# Patient Record
Sex: MALE | Race: WHITE | Employment: OTHER | URBAN - METROPOLITAN AREA
[De-identification: names, ages, dates, MRNs, and addresses within clinical notes are randomized per-mention and may not be internally consistent; named-entity substitution may affect disease eponyms.]

---

## 2017-04-13 ENCOUNTER — HOSPITAL ENCOUNTER (OUTPATIENT)
Dept: OTHER | Age: 72
Discharge: HOME | End: 2017-04-13

## 2017-04-13 DIAGNOSIS — Z00.6 CLINICAL TRIAL EXAM: ICD-10-CM

## 2018-03-27 ENCOUNTER — HOSPITAL ENCOUNTER (OUTPATIENT)
Dept: OTHER | Age: 73
Discharge: HOME | End: 2018-03-27

## 2018-03-27 DIAGNOSIS — Z00.6 CLINICAL TRIAL EXAM: ICD-10-CM

## 2021-07-15 NOTE — PROGRESS NOTES
[address] [facility] time___7/21/21 1000_________        Surgery time____1100________    Take the following medications with a sip of water:    Do not eat or drink anything after 12:00 midnight prior to your surgery. This includes water chewing gum, mints and ice chips. You may brush your teeth and gargle the morning of your surgery, but do not swallow the water     Please see your family doctor/pediatrician for a history and physical and/or concerning medications. Bring any test results/reports from your physicians office. If you are under the care of a heart doctor or specialist doctor, please be aware that you may be asked to them for clearance    You may be asked to stop blood thinners such as Coumadin, Plavix, Fragmin, Lovenox, etc., or any anti-inflammatories such as:  Aspirin, Ibuprofen, Advil, Naproxen prior to your surgery. We also ask that you stop any OTC medications such as fish oil, vitamin E, glucosamine, garlic, Multivitamins, COQ 10, etc.    We ask that you do not smoke 24 hours prior to surgery  We ask that you do not  drink any alcoholic beverages 24 hours prior to surgery     You must make arrangements for a responsible adult to take you home after your surgery. For your safety you will not be allowed to leave alone or drive yourself home. Your surgery will be cancelled if you do not have a ride home. Also for your safety, it is strongly suggested that someone stay with you the first 24 hours after your surgery. A parent or legal guardian must accompany a child scheduled for surgery and plan to stay at the hospital until the child is discharged. Please do not bring other children with you. For your comfort, please wear simple loose fitting clothing to the hospital.  Please do not bring valuables.     Do not wear any make-up or nail polish on your fingers or toes      For your safety, please do not wear any jewelry or body piercing's on the day of surgery. All jewelry must be removed. If you have dentures, they will be removed before going to operating room. For your convenience, we will provide you with a container. If you wear contact lenses or glasses, they will be removed, please bring a case for them. If you have a living will and a durable power of  for healthcare, please bring in a copy. As part of our patient safety program to minimize surgical site infections, we ask you to do the following:    · Please notify your surgeon if you develop any illness between         now and the  day of your surgery. · This includes a cough, cold, fever, sore throat, nausea,         or vomiting, and diarrhea, etc.  ·  Please notify your surgeon if you experience dizziness, shortness         of breath or blurred vision between now and the time of your surgery. Do not shave your operative site 96 hours prior to surgery. For face and neck surgery, men may use an electric razor 48 hours   prior to surgery. You may shower the night before surgery or the morning of   your surgery with an antibacterial soap. You will need to bring a photo ID and insurance card    [facility] has an onsite pharmacy, would you like to utilize our pharmacy     If you will be staying overnight and use a C-pap machine, please bring   your C-pap to hospital     Our goal is to provide you with excellent care, therefore, visitors will be limited to two(2) in the room at a time so that we may focus on providing this care for you. Please contact pre-admission testing if you have any further questions. [facility] phone number:  [phone]  Please note these are generalized instructions for all surgical cases, you may be provided with more specific instructions according to your surgery.

## 2021-07-20 ENCOUNTER — ANESTHESIA EVENT (OUTPATIENT)
Dept: ENDOSCOPY | Age: 76
End: 2021-07-20
Payer: MEDICARE

## 2021-07-21 ENCOUNTER — HOSPITAL ENCOUNTER (OUTPATIENT)
Age: 76
Setting detail: OUTPATIENT SURGERY
Discharge: HOME | End: 2021-07-21
Attending: INTERNAL MEDICINE | Admitting: INTERNAL MEDICINE
Payer: MEDICARE

## 2021-07-21 ENCOUNTER — ANESTHESIA (OUTPATIENT)
Dept: ENDOSCOPY | Age: 76
End: 2021-07-21
Payer: MEDICARE

## 2021-07-21 VITALS
TEMPERATURE: 96.7 F | OXYGEN SATURATION: 98 % | HEART RATE: 87 BPM | BODY MASS INDEX: 31.39 KG/M2 | WEIGHT: 257.8 LBS | HEIGHT: 76 IN | DIASTOLIC BLOOD PRESSURE: 68 MMHG | RESPIRATION RATE: 16 BRPM | SYSTOLIC BLOOD PRESSURE: 120 MMHG

## 2021-07-21 VITALS — OXYGEN SATURATION: 96 % | SYSTOLIC BLOOD PRESSURE: 112 MMHG | DIASTOLIC BLOOD PRESSURE: 67 MMHG

## 2021-07-21 DIAGNOSIS — Z86.010 HISTORY OF COLON POLYPS: ICD-10-CM

## 2021-07-21 LAB
GLUCOSE BLD-MCNC: 208 MG/DL (ref 70–99)
PERFORMED ON: ABNORMAL

## 2021-07-21 PROCEDURE — 7100000011 HC PHASE II RECOVERY - ADDTL 15 MIN: Performed by: INTERNAL MEDICINE

## 2021-07-21 PROCEDURE — 3700000001 HC ADD 15 MINUTES (ANESTHESIA): Performed by: INTERNAL MEDICINE

## 2021-07-21 PROCEDURE — 7100000010 HC PHASE II RECOVERY - FIRST 15 MIN: Performed by: INTERNAL MEDICINE

## 2021-07-21 PROCEDURE — 3700000000 HC ANESTHESIA ATTENDED CARE: Performed by: INTERNAL MEDICINE

## 2021-07-21 PROCEDURE — 6360000002 HC RX W HCPCS

## 2021-07-21 PROCEDURE — 2709999900 HC NON-CHARGEABLE SUPPLY: Performed by: INTERNAL MEDICINE

## 2021-07-21 PROCEDURE — 2580000003 HC RX 258: Performed by: ANESTHESIOLOGY

## 2021-07-21 PROCEDURE — 88305 TISSUE EXAM BY PATHOLOGIST: CPT

## 2021-07-21 PROCEDURE — 3609010600 HC COLONOSCOPY POLYPECTOMY SNARE/COLD BIOPSY: Performed by: INTERNAL MEDICINE

## 2021-07-21 RX ORDER — SODIUM CHLORIDE 0.9 % (FLUSH) 0.9 %
10 SYRINGE (ML) INJECTION EVERY 12 HOURS SCHEDULED
Status: DISCONTINUED | OUTPATIENT
Start: 2021-07-21 | End: 2021-07-21 | Stop reason: HOSPADM

## 2021-07-21 RX ORDER — PROPOFOL 10 MG/ML
INJECTION, EMULSION INTRAVENOUS PRN
Status: DISCONTINUED | OUTPATIENT
Start: 2021-07-21 | End: 2021-07-21 | Stop reason: SDUPTHER

## 2021-07-21 RX ORDER — SODIUM CHLORIDE 9 MG/ML
25 INJECTION, SOLUTION INTRAVENOUS PRN
Status: DISCONTINUED | OUTPATIENT
Start: 2021-07-21 | End: 2021-07-21 | Stop reason: HOSPADM

## 2021-07-21 RX ORDER — SODIUM CHLORIDE 0.9 % (FLUSH) 0.9 %
10 SYRINGE (ML) INJECTION PRN
Status: DISCONTINUED | OUTPATIENT
Start: 2021-07-21 | End: 2021-07-21 | Stop reason: HOSPADM

## 2021-07-21 RX ORDER — SODIUM CHLORIDE, SODIUM LACTATE, POTASSIUM CHLORIDE, CALCIUM CHLORIDE 600; 310; 30; 20 MG/100ML; MG/100ML; MG/100ML; MG/100ML
INJECTION, SOLUTION INTRAVENOUS CONTINUOUS
Status: DISCONTINUED | OUTPATIENT
Start: 2021-07-21 | End: 2021-07-21 | Stop reason: HOSPADM

## 2021-07-21 RX ADMIN — PROPOFOL 20 MG: 10 INJECTION, EMULSION INTRAVENOUS at 11:51

## 2021-07-21 RX ADMIN — PROPOFOL 20 MG: 10 INJECTION, EMULSION INTRAVENOUS at 11:53

## 2021-07-21 RX ADMIN — PROPOFOL 120 MG: 10 INJECTION, EMULSION INTRAVENOUS at 11:45

## 2021-07-21 RX ADMIN — PROPOFOL 20 MG: 10 INJECTION, EMULSION INTRAVENOUS at 11:49

## 2021-07-21 RX ADMIN — PROPOFOL 20 MG: 10 INJECTION, EMULSION INTRAVENOUS at 11:55

## 2021-07-21 RX ADMIN — SODIUM CHLORIDE 25 ML: 9 INJECTION, SOLUTION INTRAVENOUS at 11:05

## 2021-07-21 RX ADMIN — PROPOFOL 20 MG: 10 INJECTION, EMULSION INTRAVENOUS at 11:57

## 2021-07-21 ASSESSMENT — PAIN SCALES - GENERAL
PAINLEVEL_OUTOF10: 0

## 2021-07-21 ASSESSMENT — PAIN - FUNCTIONAL ASSESSMENT: PAIN_FUNCTIONAL_ASSESSMENT: 0-10

## 2021-07-21 NOTE — OP NOTE
Colonoscopy Procedure Note      Patient: Gigi Santiago  : 1945  Acct#:     Procedure: Colonoscopy with polypectomy (cold biopsy)    Date:  2021    Surgeon:  DO Maddison    Referring Physician:  Miah Mccullough    Previous Colonoscopy: YES  Date:   Greater than 3 years: YES    Preoperative Diagnosis:  1) History of colon polyps. Surveillance colonoscopy. Postoperative Diagnosis:  1) A 3 mm polyp in the transverse colon was removed completely with biopsy polypectomy. 2) Small internal hemorrhoids were noted. Consent:  The patient or their legal guardian has signed a consent, and is aware of the potential risks, benefits, alternatives, and potential complications of this procedure. These include, but are not limited to hemorrhage, bleeding, post procedural pain, perforation, phlebitis, aspiration, hypotension, hypoxia, cardiovascular events such as arryhthmia, and possibly death. Additionally, the possibility of missed colonic polyps and interval colon cancer was discussed in the consent. Anesthesia:  The patient was administered TIVA per anesthesiology team.  Please see their operative records for full details of medications administered. Procedure: An informed consent was obtained from the patient after explanation of indications, benefits, possible risks and complications of the procedure. The patient was then taken to the endoscopy suite, placed in the left lateral decubitus position, and the above IV anesthesia was administered. A digital rectal examination was performed and revealed negative without mass, lesions or tenderness, internal hemorrhoids noted. The Olympus video colonoscope was placed in the patient's rectum under digital direction and advanced to the cecum. The cecum was identified by characteristic anatomy and ballottment. The ileocecal valve was identified. The preparation was good.     The terminal ileum was normal

## 2021-07-21 NOTE — ANESTHESIA PRE PROCEDURE
Department of Anesthesiology  Preprocedure Note       Name:  Ancelmo Candelaria   Age:  77 y.o.  :  1945                                          MRN:  [id number]         Date:  2021      Surgeon: Piedad Rudolph):  Nereyda Swan.,     Procedure: Procedure(s):  COLONOSCOPY DIAGNOSTIC    Medications prior to admission:   Prior to Admission medications    Medication Sig Start Date End Date Taking? Authorizing Provider   aspirin 81 MG tablet Take 81 mg by mouth daily   Yes Historical Provider, MD   DIGOXIN PO Take 0.25 mg by mouth daily   Yes Historical Provider, MD   amLODIPine (NORVASC) 10 MG tablet Take 10 mg by mouth daily   Yes Historical Provider, MD   lisinopril (PRINIVIL;ZESTRIL) 40 MG tablet Take 40 mg by mouth daily   Yes Historical Provider, MD   metoprolol (TOPROL-XL) 100 MG XL tablet Take 100 mg by mouth daily   Yes Historical Provider, MD   sitaGLIPtan-metformin (JANUMET)  MG per tablet Take 1 tablet by mouth every evening   Yes Historical Provider, MD   propafenone (RYTHMOL) 225 MG tablet Take 225 mg by mouth 3 times daily   Yes Historical Provider, MD       Current medications:    No current facility-administered medications for this encounter. Allergies: Allergies   Allergen Reactions    Penicillins Rash       Problem List:  There is no problem list on file for this patient.       Past Medical History:        Diagnosis Date    AAA [facility]abdominal aortic aneurysm) ([facility] [address]     Ascending aortic aneurysm-4.2 cm by CT 2013    Atrial fibrillation ([facility] [address] 2017    Cardiac arrhythmia     Diabetes mellitus ([facility] [address]     [place] (hard of hearing)     Hyperlipidemia     Hypertension     Sleep apnea     does not wear c-pap       Past Surgical History:        Procedure Laterality Date    COLONOSCOPY      polyps    EYE SURGERY Bilateral     Cataract surgery    JOINT REPLACEMENT Right     TONSILLECTOMY         Social History:    Social History     Tobacco Use    Smoking status: Former Smoker     Quit date: 1986     Years since quittin.2    Smokeless tobacco: Never Used   Substance Use Topics    Alcohol use: Yes     Comment: 2 beers 3 days/week                                Counseling given: Not Answered      Vital Signs (Current):   Vitals:    07/15/21 1545   Weight: 260 lb (117.9 kg)   Height: 6' 4\" (1.93 m)                                              BP Readings from Last 3 Encounters:   16 133/86       NPO Status:                                                                                 BMI:   Wt Readings from Last 3 Encounters:   07/15/21 260 lb (117.9 kg)   16 282 lb 3 oz (128 kg)     Body mass index is 31.65 kg/m². CBC:   Lab Results   Component Value Date    WBC 7.9 2010    RBC 5.64 2010    HGB 16.7 2010    HCT 48.7 2010    MCV 86.3 2010    RDW 13.4 2010     2010       CMP:   Lab Results   Component Value Date     2010    K 3.6 2010     2010    CO2 24 2010    BUN 14 2010    CREATININE 0.9 2010    GFRAA >60 2010    AGRATIO 1.6 2010    GLUCOSE 204 2010    PROT 7.3 2010    CALCIUM 9.9 2010    BILITOT 0.90 2010    ALKPHOS 60 2010    AST 22 2010    ALT 31 2010       POC Tests: No results for input(s): POCGLU, POCNA, POCK, POCCL, POCBUN, POCHEMO, POCHCT in the last 72 hours.     Coags: No results found for: PROTIME, INR, APTT    HCG (If Applicable): No results found for: PREGTESTUR, PREGSERUM, HCG, HCGQUANT     ABGs: No results found for: PHART, PO2ART, JRE0YLW, NID9FLD, BEART, S4MTGSPB     Type & Screen (If Applicable):  No results found for: LABABO, LABRH    Drug/Infectious Status (If Applicable):  No results found for: HIV, HEPCAB    COVID-19 Screening (If Applicable): No results found for: COVID19        Anesthesia Evaluation  Patient summary reviewed and Nursing notes reviewed  Airway: Mallampati: II  TM distance: >3 FB   Neck ROM: full  Mouth opening: > = 3 FB Dental:    (+) upper dentures  Comment: Missing teeth bottom    Pulmonary: breath sounds clear to auscultation  (+) sleep apnea: on noncompliant,                             Cardiovascular:  Exercise tolerance: good (>4 METS),   (+) hypertension:, dysrhythmias: atrial fibrillation,       ECG reviewed  Rhythm: irregular  Rate: normal           Beta Blocker:  Dose within 24 Hrs      ROS comment: Ascending aortic aneurysm-4.2 cm     Neuro/Psych:               GI/Hepatic/Renal:             Endo/Other:    (+) Diabetes, . Abdominal:             Vascular:   + PVD, aortic or cerebral, . ROS comment: Ascending aortic aneurysm-4.2 cm. Other Findings:           Anesthesia Plan      MAC     ASA 3       Induction: intravenous. Anesthetic plan and risks discussed with patient. Plan discussed with CRNA.     Attending anesthesiologist reviewed and agrees with Preprocedure content              Katelin Babin MD   7/21/2021

## 2021-07-21 NOTE — H&P
Pre-operative History and Physical    Patient: Wing Dennison  : 1945  Acct#:     Intended Procedure:  Colonoscopy    HISTORY OF PRESENT ILLNESS:  The patient is a 77 y.o. male  who presents for/due to History of colon polyps. Surveillance colonoscopy. Past Medical History:        Diagnosis Date    AAA (abdominal aortic aneurysm) ([facility] [address]     Ascending aortic aneurysm-4.2 cm by CT 2013    Atrial fibrillation ([facility] [address] 2017    Cardiac arrhythmia     Diabetes mellitus ([facility] [address]     [place] (hard of hearing)     Hyperlipidemia     Hypertension     Sleep apnea     does not wear c-pap     Past Surgical History:        Procedure Laterality Date    COLONOSCOPY      polyps    EYE SURGERY Bilateral     Cataract surgery    JOINT REPLACEMENT Right     TONSILLECTOMY       Medications Prior to Admission:   No current facility-administered medications on file prior to encounter. Current Outpatient Medications on File Prior to Encounter   Medication Sig Dispense Refill    aspirin 81 MG tablet Take 81 mg by mouth daily      DIGOXIN PO Take 0.25 mg by mouth daily      amLODIPine (NORVASC) 10 MG tablet Take 10 mg by mouth daily      lisinopril (PRINIVIL;ZESTRIL) 40 MG tablet Take 40 mg by mouth daily      metoprolol (TOPROL-XL) 100 MG XL tablet Take 100 mg by mouth daily      sitaGLIPtan-metformin (JANUMET)  MG per tablet Take 1 tablet by mouth every evening      propafenone (RYTHMOL) 225 MG tablet Take 225 mg by mouth 3 times daily          Allergies:  Penicillins    Social History:   TOBACCO:   reports that he quit smoking about 35 years ago. He has never used smokeless tobacco.  ETOH:   reports current alcohol use. DRUGS:   reports no history of drug use. PHYSICAL EXAM:      Vital Signs: /75   Pulse 72   Temp 96.4 °F (35.8 °C) (Temporal)   Resp 16   Ht 6' 4\" (1.93 m)   Wt 257 lb 12.8 oz (116.9 kg)   SpO2 97%   BMI 31.38 kg/m²    Airway: No stridor or wheezing noted. Good air movement  Pulmonary: without wheezes. Clear to auscultation  Cardiac:regular rate and rhythm without loud murmurs  Abdomen:soft, nontender,  Bowel sounds present    Pre-Procedure Assessment / Plan:  1) History of colon polyps. Surveillance colonoscopy. ASA Grade:  ASA 3 - Patient with moderate systemic disease with functional limitations  Mallampati Classification:  Class II    Level of Sedation Plan:Deep sedation    Post Procedure plan: Return to same level of care    I assessed the patient and find that the patient is in satisfactory condition to proceed with the planned procedure and sedation plan. I have explained the risk, benefits, and alternatives to the procedure; the patient understands and agrees to proceed. The patient was counseled at length about the risks of sedrick Covid-19 during their perioperative period and any recovery window from their procedure. The patient was made aware that sedrick Covid-19  may worsen their prognosis for recovering from their procedure  and lend to a higher morbidity and/or mortality risk. All material risks, benefits, and reasonable alternatives including postponing the procedure were discussed. The patient does wish to proceed with the procedure at this time.       Norbert Wilson,   7/21/2021

## 2021-07-21 NOTE — ANESTHESIA POSTPROCEDURE EVALUATION
Department of Anesthesiology  Postprocedure Note    Patient: Demetrice Joseph  MRN: [id number]  YOB: 1945  Date of evaluation: 7/21/2021  Time:  12:23 PM     Procedure Summary     Date: 07/21/21 Room / Location: [address]    Anesthesia Start: 1140 Anesthesia Stop: 1159    Procedure: COLONOSCOPY POLYPECTOMY SNARE/COLD BIOPSY (N/A ) Diagnosis:       History of colon polyps      (History of colon polyps)    Surgeons: Norbert Butcher DO Responsible Provider: Tereso Frederick MD    Anesthesia Type: MAC ASA Status: 3          Anesthesia Type: MAC    Ekaterina Phase I: Ekaterina Score: 10    Ekaterina Phase II: Ekaterina Score: 10    Last vitals: Reviewed and per EMR flowsheets.        Anesthesia Post Evaluation    Patient location during evaluation: bedside  Patient participation: complete - patient participated  Level of consciousness: awake and alert  Airway patency: patent  Nausea & Vomiting: no nausea  Complications: no  Cardiovascular status: blood pressure returned to baseline  Respiratory status: acceptable  Hydration status: euvolemic

## 2022-06-20 ENCOUNTER — TELEPHONE (OUTPATIENT)
Dept: ORTHOPEDIC SURGERY | Age: 77
End: 2022-06-20

## 2022-06-20 RX ORDER — VALACYCLOVIR HYDROCHLORIDE 1 G/1
1000 TABLET, FILM COATED ORAL EVERY 12 HOURS
COMMUNITY
Start: 2022-01-07

## 2022-06-20 RX ORDER — ERGOCALCIFEROL (VITAMIN D2) 1250 MCG
CAPSULE ORAL
COMMUNITY
Start: 2022-04-24

## 2022-06-20 RX ORDER — DEXTROMETHORPHAN HYDROBROMIDE AND PROMETHAZINE HYDROCHLORIDE 15; 6.25 MG/5ML; MG/5ML
5 SYRUP ORAL EVERY 4 HOURS PRN
COMMUNITY
Start: 2022-02-10 | End: 2022-06-27

## 2022-06-20 RX ORDER — ROSUVASTATIN CALCIUM 40 MG/1
40 TABLET, COATED ORAL DAILY
COMMUNITY
Start: 2021-08-05 | End: 2022-06-27 | Stop reason: ALTCHOICE

## 2022-06-20 RX ORDER — GABAPENTIN 600 MG/1
TABLET ORAL
COMMUNITY
Start: 2022-06-03

## 2022-06-20 NOTE — TELEPHONE ENCOUNTER
Patient is wanting to know if Dr. Sandra Alexander will see them for their knee. Patient is 77 yrs of age. Please advise.

## 2022-06-27 ENCOUNTER — OFFICE VISIT (OUTPATIENT)
Dept: ORTHOPEDIC SURGERY | Age: 77
End: 2022-06-27
Payer: MEDICARE

## 2022-06-27 VITALS — HEIGHT: 76 IN | BODY MASS INDEX: 33.24 KG/M2 | WEIGHT: 273 LBS

## 2022-06-27 DIAGNOSIS — M25.561 CHRONIC PAIN OF RIGHT KNEE: ICD-10-CM

## 2022-06-27 DIAGNOSIS — G89.29 CHRONIC PAIN OF RIGHT KNEE: ICD-10-CM

## 2022-06-27 DIAGNOSIS — M17.12 ARTHRITIS OF LEFT KNEE: ICD-10-CM

## 2022-06-27 DIAGNOSIS — M17.11 ARTHRITIS OF RIGHT KNEE: ICD-10-CM

## 2022-06-27 DIAGNOSIS — M21.061 ACQUIRED GENU VALGUM OF RIGHT KNEE: Primary | ICD-10-CM

## 2022-06-27 DIAGNOSIS — M21.162 ACQUIRED VARUS DEFORMITY KNEE, LEFT: ICD-10-CM

## 2022-06-27 DIAGNOSIS — G89.29 CHRONIC PAIN OF LEFT KNEE: ICD-10-CM

## 2022-06-27 DIAGNOSIS — M25.562 CHRONIC PAIN OF LEFT KNEE: ICD-10-CM

## 2022-06-27 DIAGNOSIS — E11.65 POORLY CONTROLLED TYPE 2 DIABETES MELLITUS (HCC): ICD-10-CM

## 2022-06-27 PROCEDURE — G8417 CALC BMI ABV UP PARAM F/U: HCPCS | Performed by: ORTHOPAEDIC SURGERY

## 2022-06-27 PROCEDURE — 1123F ACP DISCUSS/DSCN MKR DOCD: CPT | Performed by: ORTHOPAEDIC SURGERY

## 2022-06-27 PROCEDURE — 99204 OFFICE O/P NEW MOD 45 MIN: CPT | Performed by: ORTHOPAEDIC SURGERY

## 2022-06-27 PROCEDURE — 3017F COLORECTAL CA SCREEN DOC REV: CPT | Performed by: ORTHOPAEDIC SURGERY

## 2022-06-27 PROCEDURE — 3046F HEMOGLOBIN A1C LEVEL >9.0%: CPT | Performed by: ORTHOPAEDIC SURGERY

## 2022-06-27 PROCEDURE — 1036F TOBACCO NON-USER: CPT | Performed by: ORTHOPAEDIC SURGERY

## 2022-06-27 PROCEDURE — G8427 DOCREV CUR MEDS BY ELIG CLIN: HCPCS | Performed by: ORTHOPAEDIC SURGERY

## 2022-06-27 PROCEDURE — 2022F DILAT RTA XM EVC RTNOPTHY: CPT | Performed by: ORTHOPAEDIC SURGERY

## 2022-06-27 RX ORDER — LANCETS
EACH MISCELLANEOUS
COMMUNITY
Start: 2022-06-23

## 2022-06-27 RX ORDER — DIGOXIN 125 MCG
TABLET ORAL
COMMUNITY
Start: 2022-06-23

## 2022-06-27 NOTE — PROGRESS NOTES
Joshua Little is seen today for evaluation of bilateral knee pain. Said left knee pain for about 18 months. He got a cortisone injection in August of last year and did really well for about 6 or 7 months. He fell injuring his knee about a month ago and has had some increasing pain. Right knee pain has been going on for about 4 months after he got a brace for his right dropfoot resulting from his back issues. He uses a cane for stability and security and uses CBD cream and intermittent ibuprofen. Left knee pain is 4 out of 10 and right knee pain is 2 out of 10. He has multiple medical problems including type 2 diabetes and atrial fibrillation and high blood pressure. History: Patient's relevant past family, medical, and social history are reviewed as part of today's visit. ROS of pertinent positives and negatives as above; otherwise negative. General Exam:    Vitals: Height 6' 4\" (1.93 m), weight 273 lb (123.8 kg). Constitutional: Patient is adequately groomed with no evidence of malnutrition  Mental Status: The patient is oriented to time, place and person. The patient's mood and affect are appropriate. Gait:  Patient walks with a stooped gait and the assistance of a cane. He is kyphotic. Lymphatic: The lymphatic examination bilaterally reveals all areas to be without enlargement or induration. Vascular: Examination reveals no swelling or calf tenderness. Peripheral pulses are palpable and 2+. Neurological: The patient has good coordination. He has a drop left foot and wears an AFO brace. \Skin:    Head/Neck: inspection reveals no rashes, ulcerations or lesions. Trunk:  inspection reveals no rashes, ulcerations or lesions. Right Lower Extremity: inspection reveals no rashes, ulcerations or lesions. Left Lower Extremity: inspection reveals no rashes, ulcerations or lesions. Bilateral hips are a bit stiff. Very right knee range of motion is 0 to about 110 degrees. Left is 5 to about 110. Valgus deformity on the right and varus on the left. He has severe crepitation and moderate joint line pain lateral and right medial on the left. He is grossly stable. X-rays from [facility] AP standing, PA flex views of both knees and merchant and lateral of the left knee show lateral compartment arthritis of the right knee and medial compartment arthritis of the left with severe patellofemoral changes well. Most recent hemoglobin A1c dated May 3, 2022 was 12.1. Assessment: End-stage arthritic change bilateral knees in a patient with poorly controlled diabetes. Plan: I am not comfortable proceeding with standard cortisone injections given his A1c being greater than 12. He understands importance of try to get that down. He says he is \"working on it\". In the meantime we will seek approval for Edmund Sol which can be safely administered in these patients. We did discuss the fact that the only permanent solution for his problem would be consideration of arthroplasty. Follow-up with me once we get approval for Xarelto. Medical decision making today was moderate.

## 2022-07-11 ENCOUNTER — OFFICE VISIT (OUTPATIENT)
Dept: ORTHOPEDIC SURGERY | Age: 77
End: 2022-07-11
Payer: MEDICARE

## 2022-07-11 VITALS — WEIGHT: 273 LBS | HEIGHT: 76 IN | BODY MASS INDEX: 33.24 KG/M2

## 2022-07-11 DIAGNOSIS — M17.11 ARTHRITIS OF RIGHT KNEE: ICD-10-CM

## 2022-07-11 DIAGNOSIS — M25.561 CHRONIC PAIN OF RIGHT KNEE: Primary | ICD-10-CM

## 2022-07-11 DIAGNOSIS — G89.29 CHRONIC PAIN OF RIGHT KNEE: Primary | ICD-10-CM

## 2022-07-11 DIAGNOSIS — M25.562 CHRONIC PAIN OF LEFT KNEE: ICD-10-CM

## 2022-07-11 DIAGNOSIS — G89.29 CHRONIC PAIN OF LEFT KNEE: ICD-10-CM

## 2022-07-11 DIAGNOSIS — M17.12 ARTHRITIS OF LEFT KNEE: ICD-10-CM

## 2022-07-11 PROCEDURE — 20610 DRAIN/INJ JOINT/BURSA W/O US: CPT | Performed by: ORTHOPAEDIC SURGERY

## 2022-07-11 NOTE — PROGRESS NOTES
Marlo Lay returns today to get bilateral knee injections. Pain is about 4 out of 10 but left a bit worse than right. Because of his poorly controlled diabetes I wanted to use Marilyne Combe which is markedly safer in his population. General Exam:    Vitals: Height 6' 4\" (1.93 m), weight 273 lb (123.8 kg). Constitutional: Patient is adequately groomed with no evidence of malnutrition  Mental Status: The patient is oriented to time, place and person. The patient's mood and affect are appropriate.       Bilateral hips are a bit stiff. Very right knee range of motion is 0 to about 110 degrees. Left is 5 to about 110. Valgus deformity on the right and varus on the left. He has severe crepitation and moderate joint line pain lateral and right medial on the left.     He is grossly stable.     X-rays from [facility] AP standing, PA flex views of both knees and merchant and lateral of the left knee show lateral compartment arthritis of the right knee and medial compartment arthritis of the left with severe patellofemoral changes well.     Most recent hemoglobin A1c dated May 3, 2022 was 12. 1.     Assessment: End-stage arthritic change bilateral knees in a patient with poorly controlled diabetes.     Plan: I am not comfortable proceeding with standard cortisone injections given his A1c being greater than 12. He understands importance of try to get that down. He says he is \"working on it\". Procedure: Under sterile technique, right knee was injected into the anterolateral arthroscopy portal with 32 mg of Zilretta. Under sterile technique the left knee was injected into the anterolateral arthroscopy portal with 32 mg of Zilretta. He tolerated both shots well. If those are effective we can safely repeat them in about 4 months.     He is aware that the only permanent solution for his problem would be arthroplasty.

## 2023-02-13 ENCOUNTER — APPOINTMENT (OUTPATIENT)
Dept: CT IMAGING | Age: 78
DRG: 065 | End: 2023-02-13
Payer: MEDICARE

## 2023-02-13 ENCOUNTER — APPOINTMENT (OUTPATIENT)
Dept: GENERAL RADIOLOGY | Age: 78
DRG: 065 | End: 2023-02-13
Payer: MEDICARE

## 2023-02-13 ENCOUNTER — HOSPITAL ENCOUNTER (INPATIENT)
Age: 78
LOS: 1 days | Discharge: ANOTHER HOSPITAL | DRG: 065 | End: 2023-02-14
Attending: EMERGENCY MEDICINE | Admitting: INTERNAL MEDICINE
Payer: MEDICARE

## 2023-02-13 DIAGNOSIS — R29.898 TRANSIENT LEFT LEG WEAKNESS: ICD-10-CM

## 2023-02-13 DIAGNOSIS — R00.1 SYMPTOMATIC BRADYCARDIA: ICD-10-CM

## 2023-02-13 DIAGNOSIS — R29.898 LEFT ARM WEAKNESS: ICD-10-CM

## 2023-02-13 DIAGNOSIS — I48.91 ATRIAL FIBRILLATION, UNSPECIFIED TYPE (HCC): Primary | ICD-10-CM

## 2023-02-13 PROCEDURE — 70450 CT HEAD/BRAIN W/O DYE: CPT

## 2023-02-13 PROCEDURE — 80162 ASSAY OF DIGOXIN TOTAL: CPT

## 2023-02-13 PROCEDURE — 84484 ASSAY OF TROPONIN QUANT: CPT

## 2023-02-13 PROCEDURE — 6360000004 HC RX CONTRAST MEDICATION: Performed by: EMERGENCY MEDICINE

## 2023-02-13 PROCEDURE — 83735 ASSAY OF MAGNESIUM: CPT

## 2023-02-13 PROCEDURE — 85027 COMPLETE CBC AUTOMATED: CPT

## 2023-02-13 PROCEDURE — 96375 TX/PRO/DX INJ NEW DRUG ADDON: CPT

## 2023-02-13 PROCEDURE — 36415 COLL VENOUS BLD VENIPUNCTURE: CPT

## 2023-02-13 PROCEDURE — 93005 ELECTROCARDIOGRAM TRACING: CPT | Performed by: EMERGENCY MEDICINE

## 2023-02-13 PROCEDURE — 4A03X5D MEASUREMENT OF ARTERIAL FLOW, INTRACRANIAL, EXTERNAL APPROACH: ICD-10-PCS | Performed by: RADIOLOGY

## 2023-02-13 PROCEDURE — 80053 COMPREHEN METABOLIC PANEL: CPT

## 2023-02-13 PROCEDURE — 85610 PROTHROMBIN TIME: CPT

## 2023-02-13 PROCEDURE — 99285 EMERGENCY DEPT VISIT HI MDM: CPT

## 2023-02-13 PROCEDURE — 70498 CT ANGIOGRAPHY NECK: CPT

## 2023-02-13 PROCEDURE — 71045 X-RAY EXAM CHEST 1 VIEW: CPT

## 2023-02-13 PROCEDURE — 96365 THER/PROPH/DIAG IV INF INIT: CPT

## 2023-02-13 RX ADMIN — IOPAMIDOL 75 ML: 755 INJECTION, SOLUTION INTRAVENOUS at 23:50

## 2023-02-13 ASSESSMENT — LIFESTYLE VARIABLES
HOW MANY STANDARD DRINKS CONTAINING ALCOHOL DO YOU HAVE ON A TYPICAL DAY: PATIENT DOES NOT DRINK
HOW OFTEN DO YOU HAVE A DRINK CONTAINING ALCOHOL: NEVER

## 2023-02-14 ENCOUNTER — APPOINTMENT (OUTPATIENT)
Dept: MRI IMAGING | Age: 78
DRG: 065 | End: 2023-02-14
Payer: MEDICARE

## 2023-02-14 VITALS
DIASTOLIC BLOOD PRESSURE: 86 MMHG | OXYGEN SATURATION: 96 % | HEIGHT: 76 IN | BODY MASS INDEX: 32.03 KG/M2 | HEART RATE: 55 BPM | TEMPERATURE: 98.3 F | SYSTOLIC BLOOD PRESSURE: 163 MMHG | RESPIRATION RATE: 15 BRPM | WEIGHT: 263.01 LBS

## 2023-02-14 PROBLEM — I63.9 CVA (CEREBRAL VASCULAR ACCIDENT) (HCC): Status: ACTIVE | Noted: 2023-02-14

## 2023-02-14 PROBLEM — I44.1 AV BLOCK, MOBITZ 1: Status: ACTIVE | Noted: 2023-02-14

## 2023-02-14 PROBLEM — R00.1 SYMPTOMATIC BRADYCARDIA: Status: ACTIVE | Noted: 2023-02-14

## 2023-02-14 PROBLEM — R00.1 SYMPTOMATIC BRADYCARDIA: Status: RESOLVED | Noted: 2023-02-14 | Resolved: 2023-02-14

## 2023-02-14 LAB
A/G RATIO: 1.4 (ref 1.1–2.2)
ALBUMIN SERPL-MCNC: 3.7 G/DL (ref 3.4–5)
ALP BLD-CCNC: 61 U/L (ref 40–129)
ALT SERPL-CCNC: 19 U/L (ref 10–40)
ANION GAP SERPL CALCULATED.3IONS-SCNC: 12 MMOL/L (ref 3–16)
AST SERPL-CCNC: 13 U/L (ref 15–37)
BILIRUB SERPL-MCNC: 0.5 MG/DL (ref 0–1)
BUN BLDV-MCNC: 26 MG/DL (ref 7–20)
CALCIUM SERPL-MCNC: 9.4 MG/DL (ref 8.3–10.6)
CHLORIDE BLD-SCNC: 104 MMOL/L (ref 99–110)
CHP ED QC CHECK: YES
CO2: 22 MMOL/L (ref 21–32)
CREAT SERPL-MCNC: 0.9 MG/DL (ref 0.8–1.3)
DIGOXIN LEVEL: 0.6 NG/ML (ref 0.8–2)
DIGOXIN LEVEL: 0.7 NG/ML (ref 0.8–2)
EKG DIAGNOSIS: NORMAL
EKG Q-T INTERVAL: 504 MS
EKG QRS DURATION: 160 MS
EKG QTC CALCULATION (BAZETT): 420 MS
EKG R AXIS: -63 DEGREES
EKG T AXIS: 33 DEGREES
EKG VENTRICULAR RATE: 42 BPM
GFR SERPL CREATININE-BSD FRML MDRD: >60 ML/MIN/{1.73_M2}
GLUCOSE BLD-MCNC: 317 MG/DL (ref 70–99)
GLUCOSE BLD-MCNC: 362 MG/DL
GLUCOSE BLD-MCNC: 362 MG/DL (ref 70–99)
HCT VFR BLD CALC: 46.8 % (ref 40.5–52.5)
HEMOGLOBIN: 15.7 G/DL (ref 13.5–17.5)
INR BLD: 1.05 (ref 0.87–1.14)
LV EF: 58 %
LVEF MODALITY: NORMAL
MAGNESIUM: 1.6 MG/DL (ref 1.8–2.4)
MCH RBC QN AUTO: 27.9 PG (ref 26–34)
MCHC RBC AUTO-ENTMCNC: 33.6 G/DL (ref 31–36)
MCV RBC AUTO: 83 FL (ref 80–100)
PDW BLD-RTO: 13.6 % (ref 12.4–15.4)
PERFORMED ON: ABNORMAL
PLATELET # BLD: 203 K/UL (ref 135–450)
PMV BLD AUTO: 8.6 FL (ref 5–10.5)
POTASSIUM SERPL-SCNC: 3.8 MMOL/L (ref 3.5–5.1)
PROTHROMBIN TIME: 13.6 SEC (ref 11.7–14.5)
RBC # BLD: 5.64 M/UL (ref 4.2–5.9)
SODIUM BLD-SCNC: 138 MMOL/L (ref 136–145)
TOTAL PROTEIN: 6.3 G/DL (ref 6.4–8.2)
TROPONIN: <0.01 NG/ML
TROPONIN: <0.01 NG/ML
WBC # BLD: 7.3 K/UL (ref 4–11)

## 2023-02-14 PROCEDURE — 70551 MRI BRAIN STEM W/O DYE: CPT

## 2023-02-14 PROCEDURE — 36415 COLL VENOUS BLD VENIPUNCTURE: CPT

## 2023-02-14 PROCEDURE — 93010 ELECTROCARDIOGRAM REPORT: CPT | Performed by: INTERNAL MEDICINE

## 2023-02-14 PROCEDURE — 2580000003 HC RX 258: Performed by: INTERNAL MEDICINE

## 2023-02-14 PROCEDURE — 80162 ASSAY OF DIGOXIN TOTAL: CPT

## 2023-02-14 PROCEDURE — 6360000002 HC RX W HCPCS: Performed by: EMERGENCY MEDICINE

## 2023-02-14 PROCEDURE — 6370000000 HC RX 637 (ALT 250 FOR IP): Performed by: INTERNAL MEDICINE

## 2023-02-14 PROCEDURE — 99222 1ST HOSP IP/OBS MODERATE 55: CPT | Performed by: INTERNAL MEDICINE

## 2023-02-14 PROCEDURE — 94760 N-INVAS EAR/PLS OXIMETRY 1: CPT

## 2023-02-14 PROCEDURE — 93306 TTE W/DOPPLER COMPLETE: CPT

## 2023-02-14 PROCEDURE — 6360000002 HC RX W HCPCS: Performed by: INTERNAL MEDICINE

## 2023-02-14 PROCEDURE — 84484 ASSAY OF TROPONIN QUANT: CPT

## 2023-02-14 PROCEDURE — 2060000000 HC ICU INTERMEDIATE R&B

## 2023-02-14 RX ORDER — MAGNESIUM SULFATE IN WATER 40 MG/ML
2000 INJECTION, SOLUTION INTRAVENOUS PRN
Status: DISCONTINUED | OUTPATIENT
Start: 2023-02-14 | End: 2023-02-14 | Stop reason: HOSPADM

## 2023-02-14 RX ORDER — ONDANSETRON 2 MG/ML
4 INJECTION INTRAMUSCULAR; INTRAVENOUS EVERY 6 HOURS PRN
Status: DISCONTINUED | OUTPATIENT
Start: 2023-02-14 | End: 2023-02-14 | Stop reason: HOSPADM

## 2023-02-14 RX ORDER — POTASSIUM CHLORIDE 7.45 MG/ML
10 INJECTION INTRAVENOUS PRN
Status: DISCONTINUED | OUTPATIENT
Start: 2023-02-14 | End: 2023-02-14 | Stop reason: HOSPADM

## 2023-02-14 RX ORDER — PROPAFENONE HYDROCHLORIDE 150 MG/1
225 TABLET, COATED ORAL 3 TIMES DAILY
Status: DISCONTINUED | OUTPATIENT
Start: 2023-02-14 | End: 2023-02-14 | Stop reason: HOSPADM

## 2023-02-14 RX ORDER — MAGNESIUM SULFATE 1 G/100ML
1000 INJECTION INTRAVENOUS ONCE
Status: COMPLETED | OUTPATIENT
Start: 2023-02-14 | End: 2023-02-14

## 2023-02-14 RX ORDER — ROSUVASTATIN CALCIUM 40 MG/1
40 TABLET, COATED ORAL EVERY EVENING
COMMUNITY

## 2023-02-14 RX ORDER — SODIUM CHLORIDE 9 MG/ML
INJECTION, SOLUTION INTRAVENOUS PRN
Status: DISCONTINUED | OUTPATIENT
Start: 2023-02-14 | End: 2023-02-14 | Stop reason: HOSPADM

## 2023-02-14 RX ORDER — AMLODIPINE BESYLATE 10 MG/1
10 TABLET ORAL DAILY
Status: DISCONTINUED | OUTPATIENT
Start: 2023-02-14 | End: 2023-02-14 | Stop reason: HOSPADM

## 2023-02-14 RX ORDER — ONDANSETRON 2 MG/ML
4 INJECTION INTRAMUSCULAR; INTRAVENOUS ONCE
Status: COMPLETED | OUTPATIENT
Start: 2023-02-14 | End: 2023-02-14

## 2023-02-14 RX ORDER — ENOXAPARIN SODIUM 100 MG/ML
30 INJECTION SUBCUTANEOUS 2 TIMES DAILY
Status: DISCONTINUED | OUTPATIENT
Start: 2023-02-14 | End: 2023-02-14 | Stop reason: HOSPADM

## 2023-02-14 RX ORDER — SODIUM CHLORIDE 0.9 % (FLUSH) 0.9 %
5-40 SYRINGE (ML) INJECTION EVERY 12 HOURS SCHEDULED
Status: DISCONTINUED | OUTPATIENT
Start: 2023-02-14 | End: 2023-02-14 | Stop reason: HOSPADM

## 2023-02-14 RX ORDER — ONDANSETRON 4 MG/1
4 TABLET, ORALLY DISINTEGRATING ORAL EVERY 8 HOURS PRN
Status: DISCONTINUED | OUTPATIENT
Start: 2023-02-14 | End: 2023-02-14 | Stop reason: HOSPADM

## 2023-02-14 RX ORDER — ASPIRIN 81 MG/1
81 TABLET, CHEWABLE ORAL DAILY
Status: DISCONTINUED | OUTPATIENT
Start: 2023-02-14 | End: 2023-02-14 | Stop reason: HOSPADM

## 2023-02-14 RX ORDER — SODIUM CHLORIDE 0.9 % (FLUSH) 0.9 %
5-40 SYRINGE (ML) INJECTION PRN
Status: DISCONTINUED | OUTPATIENT
Start: 2023-02-14 | End: 2023-02-14 | Stop reason: HOSPADM

## 2023-02-14 RX ADMIN — ONDANSETRON 4 MG: 2 INJECTION INTRAMUSCULAR; INTRAVENOUS at 01:08

## 2023-02-14 RX ADMIN — Medication 10 ML: at 10:35

## 2023-02-14 RX ADMIN — ASPIRIN 81 MG CHEWABLE TABLET 81 MG: 81 TABLET CHEWABLE at 10:29

## 2023-02-14 RX ADMIN — MAGNESIUM SULFATE HEPTAHYDRATE 1000 MG: 1 INJECTION, SOLUTION INTRAVENOUS at 01:00

## 2023-02-14 RX ADMIN — ENOXAPARIN SODIUM 30 MG: 100 INJECTION SUBCUTANEOUS at 10:30

## 2023-02-14 ASSESSMENT — ENCOUNTER SYMPTOMS
COUGH: 0
DIARRHEA: 0
WHEEZING: 0
SHORTNESS OF BREATH: 0
SORE THROAT: 0
VOMITING: 0
ABDOMINAL PAIN: 0
CHEST TIGHTNESS: 0
NAUSEA: 0
CONSTIPATION: 0

## 2023-02-14 ASSESSMENT — PAIN - FUNCTIONAL ASSESSMENT
PAIN_FUNCTIONAL_ASSESSMENT: NONE - DENIES PAIN

## 2023-02-14 ASSESSMENT — PAIN SCALES - GENERAL: PAINLEVEL_OUTOF10: 2

## 2023-02-14 ASSESSMENT — PAIN DESCRIPTION - LOCATION: LOCATION: HEAD

## 2023-02-14 ASSESSMENT — PAIN DESCRIPTION - ORIENTATION: ORIENTATION: OTHER (COMMENT)

## 2023-02-14 ASSESSMENT — PAIN DESCRIPTION - DESCRIPTORS: DESCRIPTORS: PATIENT UNABLE TO DESCRIBE

## 2023-02-14 NOTE — CONSULTS
History and Physical  [facility]   Cardiology    Chief Complaint: AV block    HPI:     Patient is a 69 y.o. male presented with headache and leg weakness found to have a CVA by MRI. He is being treated by Dr. Yosvany Peter for his cardiac conditions below. He denies any cardiac sx such as syncope, dizziness, chest pain Sob. He is on multiple meds for afib with none known recently. 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 Dr. Yosvany Peter 11/1/2022 note: The patient is a 52-year-old with a history of paroxysmal atrial fibrillation, type 2 diabetes, history of a trifascicular block, mixed hyperlipidemia, history of paroxysmal supraventricular tachycardia, carotid stenosis, obstructive sleep apnea, thoracic aortic aneurysm and essential hypertension. He had a cardiac catheterization in 2002 which showed normal coronaries. He had an echo in 2009 which showed an EF of 55-60% with minimal valvular heart disease. A CT of the chest done 08/2021 showed that his ascending aorta is measuring 4.1 cm which is been stable. He had a carotid ultrasound done 09/2020 which shows less than 50% bilateral internal carotid artery stenosis. The patient notes that he has not really been following his blood pressures at home but did get readings at 140/68 when he was seen for his [facility] physical. He notes that he is had increased constipation since starting Neurontin for back pain. He notes that he is had baseline energy level with baseline dyspnea exertion and denies any problems with palpitations. He is had no chest pain or lower extremity swelling. He denies any melena hematochezia. He is had no stroke or TIA like symptoms. He was on propafenone 225 mg tid, digoxin . 125 mg daily and metoprolol 100 mg daily.    Past Medical History:   Diagnosis Date    AAA (abdominal aortic aneurysm) 2013    Ascending aortic aneurysm-4.2 cm by CT 6/2013    Atrial fibrillation ([facility] [address] 2017    Cardiac arrhythmia Diabetes mellitus ([facility] [address]     [place] ([place] of hearing)     Hyperlipidemia     Hypertension     Sleep apnea     does not wear c-pap      Past Surgical History:   Procedure Laterality Date    COLONOSCOPY      polyps    COLONOSCOPY N/A 2021    COLONOSCOPY POLYPECTOMY SNARE/COLD BIOPSY performed by Erma Carter.,  at [address] Bilateral     Cataract surgery    JOINT REPLACEMENT Right     TONSILLECTOMY          Medications Prior to Admission: metFORMIN (GLUCOPHAGE) 500 MG tablet, Take 500 mg by mouth 2 times daily (with meals)  SITagliptin (JANUVIA) 100 MG tablet, Take 100 mg by mouth daily  rosuvastatin (CRESTOR) 40 MG tablet, Take 40 mg by mouth every evening  digoxin (LANOXIN) 125 MCG tablet, TAKE 1 TABLET BY MOUTH EVERY DAY  ONE TOUCH ULTRASOFT LANCETS MISC, USE DAILY AS DIRECTED  ergocalciferol (ERGOCALCIFEROL) 1.25 MG (12754 UT) capsule, TAKE 1 CAPSULE BY MOUTH EVERY 7 DAYS  gabapentin (NEURONTIN) 600 MG tablet, Take 600 mg by mouth 3 times daily.   metFORMIN (GLUCOPHAGE) 500 MG tablet, Take by mouth 2 times daily (with meals)  aspirin 81 MG tablet, Take 81 mg by mouth daily  amLODIPine (NORVASC) 10 MG tablet, Take 10 mg by mouth daily  lisinopril (PRINIVIL;ZESTRIL) 40 MG tablet, Take 40 mg by mouth daily  metoprolol (TOPROL-XL) 100 MG XL tablet, Take 100 mg by mouth daily  propafenone (RYTHMOL) 225 MG tablet, Take 225 mg by mouth 3 times daily  [DISCONTINUED] sitaGLIPtan-metformin (JANUMET)  MG per tablet, Take 1 tablet by mouth every evening    Allergies   Allergen Reactions    Atorvastatin      Muscle aches    Penicillins Rash    Pravastatin      Leg pain      Social History     Tobacco Use    Smoking status: Former     Types: Cigarettes     Quit date: 1986     Years since quittin.8    Smokeless tobacco: Never   Substance Use Topics    Alcohol use: Yes     Comment: 2 beers 3 days/week      Family History   Problem Relation Age of Onset    No Known Problems Mother Heart Disease Father         Review of Systems:  Constitutional: No Fever or Weight Loss  Eyes: No decreased vision  ENT: No Headaches, Hearing Loss or Vertigo  Cardiovascular: No chest pain, dyspnea on exertion, palpitations or loss of consciousness  Respiratory: No cough or wheezing    Gastrointestinal: No abdominal pain, appetite loss, blood in stools, constipation, diarrhea or heartburn  Genitourinary: No dysuria, trouble voiding, or hematuria  Musculoskeletal:  No gait disturbance, weakness or joint complaints  Integumentary: No rash or pruritis  Neurological: TIA and stroke symptoms largely resolved  Psychiatric: No anxiety or depression  Endocrine: No malaise, fatigue or temperature intolerance  Hematologic/Lymphatic: No bleeding problems, blood clots or swollen lymph nodes  Allergic/Immunologic: No nasal congestion or hives      Objective Data:     BP (!) 163/81   Pulse (!) 36   Temp 98.3 °F (36.8 °C) (Oral)   Resp 15   Ht 6' 4\" (1.93 m)   Wt 263 lb 0.1 oz (119.3 kg)   SpO2 97%   BMI 32.01 kg/m²     General appearance: alert, appears stated age, cooperative, and moderately obese  Alert, awake, oriented x 3  Eyes:  No erythema  Head: atraumatic  Neck: no carotid bruit and no JVD  Lungs: clear to auscultation bilaterally  Heart: normal apical impulse and regularly irregular rhythm  Abdomen: soft, non-tender; bowel sounds normal; no masses,  no organomegaly  Skin: Skin color, texture, turgor normal. No rashes or lesions  Hematologic: no remarkable bruising       ECG: normal sinus rhythm, 2nd degree, Mobitz type 1 heart block, and left anterior hemiblock or RBBB   Not afib   artifact obscured  Data Review     Summary echo:   Left ventricular cavity size is decreased. There is mild to moderate   concentric left ventricular hypertrophy. Overall left ventricular systolic   function appears normal with an ejection fraction of 55-60%.  .   The mitral valve leaflets are slightly thickened with normal leaflet   mobility. Trivial mitral regurgitation is present. The right ventricle is normal in size. Alma Gilbert      Signature      ------------------------------------------------------------------   Electronically signed by Elli Ellison MD (Interpreting   physician) on 02/14/2023 at 03:05 PM   ------------------------------------------------------------------  MRI. Linear area of acute infarct involving the left cerebellar hemisphere. Punctate area of acute infarct in the left vermis of the cerebellum. Cerebral atrophy and chronic small vessel ischemic changes. Recent Labs     02/13/23  2345      K 3.8      CO2 22   BUN 26*   CREATININE 0.9     Recent Labs     02/13/23  2345   WBC 7.3   HGB 15.7   HCT 46.8   MCV 83.0        Lab Results   Component Value Date/Time    CKTOTAL 132 03/25/2010 06:04 AM    CKMB 2.65 03/25/2010 06:04 AM    TROPONINI <0.01 02/14/2023 07:31 AM         Assessment:     Active Problems:    CVA (cerebral vascular accident) ([address]    AV block, Mobitz 1      Plan:     1. The patient presented with cva sx and mri stroke. 2. He has asymptomatic av wenckebach with longstanding RBBB/LAD per records. He is on rate slowing meds which are being held( dig and metoprolol). Our plan would be to monitor and adjusting meds, and monitor for more advanced av block.  They wanted to review situation before transferring to [facility] where his physicians are.   3. Reviewed in detail with patient, daughters and RN

## 2023-02-14 NOTE — ED NOTES
ED SBAR report provider to [facility]. Patient to be transported to [facility] via stretcher by RN. Patient transported with bedside cardiac monitor and with IV medications infusing. IV site clean, dry, and intact. MEWS score and pain assessed as 0/10 and documented. Patient's score on the FOREMAN Fall scale reviewed with receiving RN. Updated patient and family on plan of care.        Merlyn Guy RN  02/14/23 2037

## 2023-02-14 NOTE — CARE COORDINATION
INITIAL CASE MANAGEMENT ASSESSMENT     Reviewed chart, met with patient to assess possible discharge needs. Explained Case Management role/services. SW interviewed patient and both of his daughters at bedside today. Patient gave this writer permission to speak freely in front of family. Living Situation: Patient resides in a single family home with his wife and no pets. There are two stairs leading up to the front door. Prior to medical admission patient reports that he had no trouble getting in and out of the property. ADLs: Prior to medical admission patient was reportedly independent. He stated that he doesn't anticipate any new needs at discharge. DME: Prior to medical admission patient had a cane and a rolling walker. He stated that he doesn't anticipate any new needs at discharge. PT/OT Recs: Not ordered. Active Services: Prior to medical admission patient had no active services in place. He stated that he doesn't anticipate any new needs at discharge. Transportation: Prior to medical admission patient reports that he was an active . He stated that family will likely transport him home at discharge. Medications: Patient receives medications from [facility] on [address]. At this time there are no issues with access or affordability. PCP: Can Odell [phone] (phone) and [phone] (fax number). HD/PD: N/A     PLAN/COMMENTS:   1) Waiting on neurology and cardiology clearance. 2) Possible therapy needs. Family is requesting a transfer to [facility]. They are in the process of assigning him a room. SW provided contact information for patient or family to call with any questions. SW will follow and assist as needed. Respectfully submitted,     BENIGNO Arteaga-S  [facility]   [phone]    Electronically signed by RAKEL Montgomery on 2/14/2023 at 4:19 PM

## 2023-02-14 NOTE — PROGRESS NOTES
Pt is being transferred to [facility] to [address] [facility] [address]. Gave report to Yieson at [address]. All questions were answered. Pt left with two IV's right and left AC. Family was updated on the situation. Report was given to transporters. Pt vital signs were stable. All belongings were given to the transporters.      Electronically signed by Oksaan Grady RN on 2/14/2023 at 5:25 PM

## 2023-02-14 NOTE — PLAN OF CARE
Problem: Discharge Planning  Goal: Discharge to home or other facility with appropriate resources  2/14/2023 1229 by Arnoldo Mejia RN  Outcome: Progressing     Problem: Safety - Adult  Goal: Free from fall injury  2/14/2023 1229 by Arnoldo Mejia RN  Outcome: Progressing     Problem: ABCDS Injury Assessment  Goal: Absence of physical injury  2/14/2023 1229 by Arnoldo Mejia RN  Outcome: Progressing     Problem: Pain  Goal: Verbalizes/displays adequate comfort level or baseline comfort level  Outcome: Progressing

## 2023-02-14 NOTE — PLAN OF CARE
Problem: Discharge Planning  Goal: Discharge to home or other facility with appropriate resources  Outcome: Progressing  Flowsheets  Taken 2/14/2023 0530  Discharge to home or other facility with appropriate resources: Identify barriers to discharge with patient and caregiver  Taken 2/14/2023 0504  Discharge to home or other facility with appropriate resources: Identify barriers to discharge with patient and caregiver     Problem: Safety - Adult  Goal: Free from fall injury  Outcome: Progressing     Problem: ABCDS Injury Assessment  Goal: Absence of physical injury  Outcome: Progressing

## 2023-02-14 NOTE — PROGRESS NOTES
4 Eyes Skin Assessment     NAME:  Elver Dennison  YOB: 1945  MEDICAL RECORD NUMBER:  [id number]    The patient is being assessed for  Admission    I agree that One RN has performed a thorough Head to Toe Skin Assessment on the patient. ALL assessment sites listed below have been assessed. Areas assessed by both nurses:    Head, Face, Ears, Shoulders, Back, Chest, Arms, Elbows, Hands, Sacrum. Buttock, Coccyx, Ischium, and Legs. Feet and Heels        Does the Patient have a Wound?  No noted wound(s)       Prasanth Prevention initiated by RN: Yes   Wound Care Orders initiated by RN: No    Pressure Injury (Stage 3,4, Unstageable, DTI, NWPT, and Complex wounds) if present, place referral order by RN under : No    New and Established Ostomies, if present place, referral order under : No      Nurse 1 eSignature: Electronically signed by Chinyere Hedrick RN on 2/14/23 at 5:59 AM EST    **SHARE this note so that the co-signing nurse can place an eSignature**    Nurse 2 eSignature: Electronically signed by Stephanie Cruz RN on 2/14/23 at 6:00 AM EST

## 2023-02-14 NOTE — ED PROVIDER NOTES
[facility] CARE  EMERGENCY DEPARTMENT ENCOUNTER      Pt Name: Ean Panchal  MRN: [id number]  [place] 1945  Date of evaluation: 2/13/2023  Provider: Karlie Hagan MD    CHIEF COMPLAINT       Chief Complaint   Patient presents with    Bradycardia       HISTORY OF PRESENT ILLNESS    Ean Panchal is a 69 y.o. male who  has a past medical history of AAA (abdominal aortic aneurysm), Atrial fibrillation ([address] Cardiac arrhythmia, Diabetes mellitus ([address] [place] (hard of hearing), Hyperlipidemia, Hypertension, and Sleep apnea. who presents to the emergency department with EMS for evaluation of bradycardia and episode of left arm and leg weakness that occurred at 2130. Patient states the symptoms lasted for approximately 5 minutes. States he got extremely dizzy when the weakness on the left arm and the leg occurred. Patient states that the symptoms did resolve but that he still felt generally fatigued and therefore EMS was called. Per EMS patient's NIH is 0 in route to the hospital.  Patient is alert and answering questions appropriate. Denies any chest pain. States he does have intermittent lightheadedness. Does have history of intermittent A-fib as well. States he has had to have cardioversion in the past.  Patient does not take any blood thinners but does take aspirin daily. No abdominal pain. No nausea or vomiting. No headache or vision changes. No speech difficulty. Nursing Notes were reviewed. Including nursing noted for FM, Surgical History, Past Medical History, Social History, vitals, and allergies; agree with all. REVIEW OF SYSTEMS       Review of Systems   Constitutional:  Negative for chills, diaphoresis and fever. HENT:  Negative for congestion and sore throat. Respiratory:  Negative for cough, chest tightness, shortness of breath and wheezing. Cardiovascular:  Negative for chest pain and leg swelling.    Gastrointestinal:  Negative for abdominal pain, constipation, diarrhea, nausea and vomiting. Genitourinary:  Negative for dysuria, frequency and urgency. Musculoskeletal:  Negative for arthralgias and neck pain. Skin:  Negative for rash and wound. Neurological:  Positive for dizziness and weakness. Negative for numbness. Psychiatric/Behavioral:  Negative for agitation and behavioral problems. Except as noted above the remainder of the review of systems was reviewed and negative.      PAST MEDICAL HISTORY     Past Medical History:   Diagnosis Date    AAA (abdominal aortic aneurysm) 2013    Ascending aortic aneurysm-4.2 cm by CT 6/2013    Atrial fibrillation ([facility] [address] 2017    Cardiac arrhythmia     Diabetes mellitus ([facility] [address]     [place] (hard of hearing)     Hyperlipidemia     Hypertension     Sleep apnea     does not wear c-pap       SURGICAL HISTORY       Past Surgical History:   Procedure Laterality Date    COLONOSCOPY      polyps    COLONOSCOPY N/A 7/21/2021    COLONOSCOPY POLYPECTOMY SNARE/COLD BIOPSY performed by Sumanth Deal DO at [address] Bilateral     Cataract surgery    JOINT REPLACEMENT Right     TONSILLECTOMY         CURRENT MEDICATIONS       Current Discharge Medication List        CONTINUE these medications which have NOT CHANGED    Details   digoxin (LANOXIN) 125 MCG tablet TAKE 1 TABLET BY MOUTH EVERY DAY      ONE TOUCH ULTRASOFT LANCETS MISC USE DAILY AS DIRECTED      ergocalciferol (ERGOCALCIFEROL) 1.25 MG (10279 UT) capsule TAKE 1 CAPSULE BY MOUTH EVERY 7 DAYS      gabapentin (NEURONTIN) 600 MG tablet GABAPENTIN 600 MG TABS      valACYclovir (VALTREX) 1 g tablet Take 1,000 mg by mouth every 12 hours      metFORMIN (GLUCOPHAGE) 500 MG tablet Take by mouth 2 times daily (with meals)      aspirin 81 MG tablet Take 81 mg by mouth daily      amLODIPine (NORVASC) 10 MG tablet Take 10 mg by mouth daily      lisinopril (PRINIVIL;ZESTRIL) 40 MG tablet Take 40 mg by mouth daily      metoprolol (TOPROL-XL) 100 MG XL tablet Take 100 mg by mouth daily      sitaGLIPtan-metformin (JANUMET)  MG per tablet Take 1 tablet by mouth every evening      propafenone (RYTHMOL) 225 MG tablet Take 225 mg by mouth 3 times daily             ALLERGIES     Atorvastatin, Penicillins, and Pravastatin    FAMILY HISTORY        Family History   Problem Relation Age of Onset    No Known Problems Mother     Heart Disease Father        SOCIAL HISTORY       Social History     Socioeconomic History    Marital status:      Spouse name: None    Number of children: None    Years of education: None    Highest education level: None   Tobacco Use    Smoking status: Former     Types: Cigarettes     Quit date: 1986     Years since quittin.8    Smokeless tobacco: Never   Vaping Use    Vaping Use: Never used   Substance and Sexual Activity    Alcohol use: Yes     Comment: 2 beers 3 days/week    Drug use: Never    Sexual activity: Yes     Partners: Female       PHYSICAL EXAM       ED Triage Vitals [23 2325]   BP Temp Temp Source Heart Rate Resp SpO2 Height Weight   (!) 176/91 97.7 °F (36.5 °C) Oral (!) 46 14 99 % 6' 4\" (1.93 m) 277 lb 9.6 oz (125.9 kg)       Physical Exam  Vitals and nursing note reviewed. Constitutional:       General: He is not in acute distress. Appearance: He is well-developed. He is not diaphoretic. HENT:      Head: Normocephalic and atraumatic. Right Ear: Tympanic membrane normal.      Left Ear: Tympanic membrane normal.      Nose: Nose normal.      Mouth/Throat:      Mouth: Mucous membranes are moist.      Pharynx: Oropharynx is clear. Eyes:      General: No visual field deficit. Extraocular Movements: Extraocular movements intact. Conjunctiva/sclera: Conjunctivae normal.      Pupils: Pupils are equal, round, and reactive to light. Cardiovascular:      Rate and Rhythm: Regular rhythm. Bradycardia present. Pulses: Normal pulses. Heart sounds: Normal heart sounds. No murmur heard. No friction rub. No gallop. Pulmonary:      Effort: Pulmonary effort is normal.      Breath sounds: Normal breath sounds. No wheezing, rhonchi or rales. Abdominal:      General: Bowel sounds are normal. There is no distension. Palpations: Abdomen is soft. Tenderness: There is no abdominal tenderness. There is no guarding or rebound. Musculoskeletal:         General: No tenderness. Normal range of motion. Cervical back: Normal range of motion and neck supple. Skin:     General: Skin is warm and dry. Capillary Refill: Capillary refill takes less than 2 seconds. Findings: No erythema. Neurological:      General: No focal deficit present. Mental Status: He is alert and oriented to person, place, and time. Mental status is at baseline. GCS: GCS eye subscore is 4. GCS verbal subscore is 5. GCS motor subscore is 6. Cranial Nerves: No cranial nerve deficit, dysarthria or facial asymmetry. Sensory: Sensation is intact. No sensory deficit. Motor: Motor function is intact. No weakness, abnormal muscle tone or pronator drift. Coordination: Coordination is intact. Coordination normal. Finger-Nose-Finger Test and Heel to [facility] Test normal.   Psychiatric:         Mood and Affect: Mood normal.         Behavior: Behavior normal.         Thought Content:  Thought content normal.       DIAGNOSTIC RESULTS     EKG: All EKG's are interpreted by the Emergency Department Physician who either signs or Co-signs this chart in the absence of acardiologist.    Interpreted by myself   Atrial fibrillation with slow ventricular response at a rate of 42, left axis deviation, , QTc 420, no ST elevations, no ST depressions, Q waves inferior leads, right bundle branch block present, biphasic T wave anterior lateral leads, rhythm change from sinus rhythm with first-degree AV block with bifascicular block to A-fib with slow ventricular response of compared EKG from 4/28/2016    RADIOLOGY:   Non-plain film images such as CT, Ultrasoundand MRI are read by the radiologist. Plain radiographic images are visualized and preliminarily interpreted by the emergency physician with the below findings:    Chest x-ray with no acute cardiopulmonary abnormality    ED BEDSIDE ULTRASOUND:   Performed by ED Physician - none    LABS:  Labs Reviewed   COMPREHENSIVE METABOLIC PANEL - Abnormal; Notable for the following components:       Result Value    Glucose 317 (*)     BUN 26 (*)     Total Protein 6.3 (*)     AST 13 (*)     All other components within normal limits   MAGNESIUM - Abnormal; Notable for the following components:    Magnesium 1.60 (*)     All other components within normal limits   DIGOXIN LEVEL - Abnormal; Notable for the following components:    Digoxin Lvl 0.6 (*)     All other components within normal limits   POCT GLUCOSE - Normal   CBC   TROPONIN   PROTIME-INR   TROPONIN   DIGOXIN LEVEL       All other labs were withinnormal range or not returned as of this dictation. PROCEDURES:  Procedures    EMERGENCY DEPARTMENT COURSE and DIFFERENTIAL DIAGNOSIS/MDM:     PMH, Surgical Hx, FH, Social Hx reviewed by myself (ETOH usage, Tobacco usage, Drug usage reviewed by myself, no pertinent Hx)-  has a past medical history of AAA (abdominal aortic aneurysm), Atrial fibrillation ([address] Cardiac arrhythmia, Diabetes mellitus ([address] [place] (hard of hearing), Hyperlipidemia, Hypertension, and Sleep apnea. Old records were reviewed by me     MDM    Patient seen and evaluated. History and physical as above. Patient arrives bradycardic. Patient had a transient episode of left arm and leg weakness that occurred 2130 this evening. Patient symptoms did resolve prior to arrival here in the emergency room. Patient persistently bradycardic 35-45 on the monitor. Had dizziness. No longer having dizziness.   No focal numbness or weakness on exam.  NIH of 0 on arrival.  Stroke alert was called on patient arrival secondary to his transient weakness in the left side of his body. DDX-TIA, stroke, CVA, bradycardia, A-fib, dehydration, electrolyte abnormality, complete heart block, slow ventricular sponsor A-fib, other  Social Determinants (Poverty, Education, uninsured) -none  Prior notes-internal medicine office visit note from 10/3/2022,   Name and route all medications-IV magnesium sulfate, IV Zofran  Charting interpretations all by myself-chest x-ray no acute cardiopulmonary abnormality, CT head negative  Diagnosis descriptions-transient acute left-sided arm and leg weakness, acute bradycardia, intermittent lightheadedness  Consults-stroke team, Dr. David Woods, hospitalist, Dr. Kedar Gambino    Is this patient to be included in the SEP-1 Core Measure due to severe sepsis or septic shock? No   Exclusion criteria - the patient is NOT to be included for SEP-1 Core Measure due to:  2+ SIRS criteria are not met    ED Course as of 02/14/23 0622   Mon Feb 13, 2023   2337 Code stroke called   [DS]   80 Spoke with Dr. Juna Lennox,  stroke team, discussed patient's transient left-sided weakness and nature of 0 at this time. Also discussed patient's bradycardia. He does agree that patient needs stroke work-up but thinks it may be cardiac related. Does recommend admission even if unremarkable stroke work-up. Will follow along with imaging. [DS]   Tue Feb 14, 2023   0019 Radiology called and patient CT head with no acute abnormality. [DS]   5973 Patient reassessed. Patient still has NIH of 0 on reassessment. No new deficits and patient is alert and answering questions appropriately. Updated on results thus far. WBC 7.3 with hemoglobin 15.7. Platelets 541. Glucose 362. Patient still having intermittent episodes of bradycardia down into the mid 30s and states he does have intermittent episodes of lightheadedness. Heart rate does come back up into the 50s without any intervention.  [DS] 5559 CTA with no large vessel occlusion or flow-limiting stenosis. [DS]   9535 Troponin negative. Coags within normal limits. Magnesium low at 1.6.  1 g of IV magnesium ordered. Creatinine 0.9 with BUN of 26. Sodium 138, potassium 3.8. Anion gap of 12 with CO2 of 22. Patient updated on results. Discussed admission for cardiac evaluation for his bradycardia as well as further neurologic evaluation for his left-sided transient weakness. Patient agreeable. Consultation to hospitalist for admission. [DS]      ED Course User Index  [DS] Lou Rodriguez MD         I PERSONALLY SAW THE PATIENT AND PERFORMED A SUBSTANTIVE PORTION OF THE VISIT INCLUDING ALL ASPECTS OF THE MEDICAL DECISION MAKING PROCESS. The primary clinician of record Lou Rodriguez MD    CRITICAL CARE TIME   Total Critical Caretime was 39 minutes, excluding separately reportable procedures. There was a high probability of clinically significant/life threatening deterioration in the patient's condition which required my urgent intervention. CRITICAL CARE  I personally saw the patient and independently provided 39 minutes of non-concurrent critical care out of the total shared critical care time provided. This excludes seperately billable procedures. Critical care time was provided for symptomatic bradycardia, acute stroke evaluation, consultation with stroke team, frequent neurologic checks that required close evaluation and/or intervention with concern for potential patient decompensation. FINAL IMPRESSION      1. Atrial fibrillation, unspecified type ([address]    2. Symptomatic bradycardia    3. Transient left leg weakness    4. Left arm weakness          DISPOSITION/PLAN   DISPOSITION Admitted 02/14/2023 03:33:16 AM    PATIENT REFERRED TO:  No follow-up provider specified.     DISCHARGE MEDICATIONS:  Current Discharge Medication List             (Please note that portions ofthis note were completed with a voice recognition program. Efforts were made to edit the dictations but occasionally words are mis-transcribed.)    Jamin Sharma MD(electronically signed)  Attending Emergency Physician        Jamin Sharma MD  02/14/23 7037

## 2023-02-14 NOTE — CONSULTS
Neurology Consult Note  Reason for Consult: stroke    Chief complaint: trouble walking, left arm and leg weakness    Dr Jenise Sanchez MD asked me to see Сергей Nieves in consultation for evaluation of stroke    History of Present Illness:  Сергей Nieves is a 69 y.o. male who presents with possible stroke. I obtained my information via interview w/ the patient, supplemented by chart review. The patient usually ambulates w/ a cane. He has chronic back issues and some foot drop on the R. Around 2130 last night he unfortunately received some bad news about his wife who is now hospitalized at [facility].  He says he developed a headache. He noticed he was stumbling a bit. Eventually he was able to lay down in bed. He was having trouble getting up. EMS was called. He did noticed some LUE/LLE weakness. No other neurologic deficits. He was transported to the ED just after 2300. BP was 176/91. CT head negative. CTA head/neck negative. He has known atrial fibrillation, his HR as low as 37 in the ED.  NIH was 0. Currently he continues to have a mild headache, otherwise he feels back to normal.      He is not anticoagulated. Cardiology notes suggest he had declined in the past.  He denies any hx of stroke/TIA.       Medical History:  Past Medical History:   Diagnosis Date    AAA (abdominal aortic aneurysm) 2013    Ascending aortic aneurysm-4.2 cm by CT 6/2013    Atrial fibrillation ([facility] [address] 2017    Cardiac arrhythmia     Diabetes mellitus ([facility] [address]     [place] (hard of hearing)     Hyperlipidemia     Hypertension     Sleep apnea     does not wear c-pap     Past Surgical History:   Procedure Laterality Date    COLONOSCOPY      polyps    COLONOSCOPY N/A 7/21/2021    COLONOSCOPY POLYPECTOMY SNARE/COLD BIOPSY performed by Og Bolden,  at [address] [facility] Bilateral     Cataract surgery    JOINT REPLACEMENT Right     TONSILLECTOMY       Scheduled Meds:   aspirin  81 mg Oral Daily    propafenone  225 mg Oral TID    amLODIPine  10 mg Oral Daily    sodium chloride flush  5-40 mL IntraVENous 2 times per day    enoxaparin  30 mg SubCUTAneous BID     Medications Prior to Admission:   metFORMIN (GLUCOPHAGE) 500 MG tablet, Take 500 mg by mouth 2 times daily (with meals)  SITagliptin (JANUVIA) 100 MG tablet, Take 100 mg by mouth daily  rosuvastatin (CRESTOR) 40 MG tablet, Take 40 mg by mouth every evening  digoxin (LANOXIN) 125 MCG tablet, TAKE 1 TABLET BY MOUTH EVERY DAY  ONE TOUCH ULTRASOFT LANCETS MISC, USE DAILY AS DIRECTED  ergocalciferol (ERGOCALCIFEROL) 1.25 MG (12845 UT) capsule, TAKE 1 CAPSULE BY MOUTH EVERY 7 DAYS  gabapentin (NEURONTIN) 600 MG tablet, Take 600 mg by mouth 3 times daily. metFORMIN (GLUCOPHAGE) 500 MG tablet, Take by mouth 2 times daily (with meals)  aspirin 81 MG tablet, Take 81 mg by mouth daily  amLODIPine (NORVASC) 10 MG tablet, Take 10 mg by mouth daily  lisinopril (PRINIVIL;ZESTRIL) 40 MG tablet, Take 40 mg by mouth daily  metoprolol (TOPROL-XL) 100 MG XL tablet, Take 100 mg by mouth daily  propafenone (RYTHMOL) 225 MG tablet, Take 225 mg by mouth 3 times daily  [DISCONTINUED] sitaGLIPtan-metformin (JANUMET)  MG per tablet, Take 1 tablet by mouth every evening    Allergies   Allergen Reactions    Atorvastatin      Muscle aches    Penicillins Rash    Pravastatin      Leg pain     Family History   Problem Relation Age of Onset    No Known Problems Mother     Heart Disease Father      Social History     Tobacco Use   Smoking Status Former    Types: Cigarettes    Quit date: 1986    Years since quittin.8   Smokeless Tobacco Never     Social History     Substance and Sexual Activity   Drug Use Never     Social History     Substance and Sexual Activity   Alcohol Use Yes    Comment: 2 beers 3 days/week     ROS  Constitutional- No weight loss or fevers  Eyes- No diplopia. No photophobia. Ears/nose/throat- No dysphagia.  No Dysarthria  Cardiovascular- No palpitations. No chest pain  Respiratory- No dyspnea. No Cough  Gastrointestinal- No Abdominal pain. No Vomiting. Genitourinary- No incontinence. No urinary retention  Musculoskeletal- No myalgia. No arthralgia  Skin- No rash. No easy bruising. Psychiatric- No depression. No anxiety  Endocrine- No diabetes. No thyroid issues. Hematologic- No bleeding difficulty. No fatigue  Neurologic- No weakness. No Headache. Exam  Blood pressure (!) 160/49, pulse 50, temperature 97.8 °F (36.6 °C), temperature source Oral, resp. rate 10, height 6' 4\" (1.93 m), weight 263 lb 0.1 oz (119.3 kg), SpO2 99 %. Constitutional    Vital signs: BP, HR, and RR reviewed   General alert, no distress, well-nourished  Eyes: unable to visualize the fundi  Cardiovascular: no peripheral edema. Psychiatric: cooperative with examination, no psychotic behavior noted. Neurologic  Mental status:   orientation to person, place. General fund of knowledge grossly intact   Memory grossly intact   Attention intact as able to attend well to the exam     Language fluent in conversation   Comprehension intact; follows simple commands  Cranial nerves:   CN2: visual fields full   CN 3,4,6: extraocular muscles intact. Pupils are equal, round, reactive bilaterally. CN5: facial sensation symmetric   CN7: face symmetric without dysarthria  CN8: hearing grossly intact  CN11: trap full strength on shoulder shrug  CN12: tongue midline with protrusion  Strength: chronic R dorsiflexion weakness, otherwise 5/5 throughout. Deep tendon reflexes: normal in all 4 extremities  Sensory: light touch intact in all 4 extremities. Cerebellar/coordination: questionable bit of clumsiness in LUE. He has trouble w/ heel to shin in both LEs. Tone: normal in all 4 extremities  Gait: deferred at this time for safety.       Labs  HgA1c (Aug) 12.0  LDL (Aug) 70    Glucose 317  Na 138  K 3.8  BUN 26  Cr 0.9  Mg 1.60    ALT 19  AST 13    WBC 7.3K  Hg 15.7  Platelets 203    Studies  CT head w/o 2/13/23, independently reviewed  Impression   No acute intracranial abnormality. CTA head/neck 2/13/23, independently reviewed  Impression   Unremarkable CTA of the head and neck. No large vessel occlusion. EKG 2/13/23  Atrial fibrillation w/ slow ventricular response. Impression/Recommendations  LUE/LLE weakness. Gait impairment/imbalance. Atrial fibrillation. DM. Uncontrolled. HLD.   HTN.    DOLLY. Concerned about stroke versus TIA. He has multiple risk factors for stroke (some uncontrolled) including atrial fibrillation for which he is not anticoagulated. Ordered MRI brain w/o. TTE. Check HgA1c, lipid panel. Continue home statin. He will likely need anticoagulation moving forward for stroke/TIA prevention. CHADS VASC is at least 6. Would wait to see results of brain MRI first if the decision is made to initiate. Reasonable to keep SBP near 160-180 for now. Goal HgA1c < 7.0. PT/OT. He would like to be transferred to [facility] to be closer to his wife and so his family can be together. RN aware.   Will defer to hospitalist.      Tasha Rea NP  [address] [place] [address] [id number] Neurology    A copy of this note was provided for Dr Kathy Lopez MD

## 2023-02-14 NOTE — H&P
History and Physical  Ross Perera MD      Name:  Jennifer Dennison /Age/Sex: 1945  (69 y.o. male)   MRN & CSN:  [id number] & [id number] Admission Date/Time: 2023 11:07 PM   Location:  [id number] PCP: [facility] Day: 2    Assessment and Plan:     Marie Yun is a 69 y.o.  male  who presents with left-sided weakness concerning for TIA/stroke    Left-sided weakness with gait impairment Imbalance concerning for stroke versus TIA. Will get MRI. Get 2D echo. Do stroke work-up and consult neurology. Severe bradycardia. Patient heart rate remains in 30s. Will consult cardiology. Hold beta-blocker, propafenone and digoxin for now. Further management per cardiology including anticoagulation    A-fib with bradycardia. As above    Anxiety. Once stroke is ruled out then we will start him on medications    Other chronic medical conditions including hypertension, hyperlipidemia, struct of sleep apnea. Hold blood pressure medication to rule out stroke. Resume other home medication. Body mass index is 32.01 kg/m². Diet ADULT DIET; Regular   DVT Prophylaxis [x] Lovenox, []  Heparin, [] SCDs, [] Ambulation   GI Prophylaxis [] PPI,  [] H2 Blocker,  [x] Carafate,  [] Diet/Tube Feeds   Code Status Patient's code status was discussed and is Full Code   Disposition Home ECF in 2-3 days. Currently he wants to be transferred to [address] [place] [address] [] Low, [x] Moderate,[]  High  Patient's risk as above due to above     Patient is in agreement with the plan as stated above. The patient's medication has been reviewed and verified with the patient and/or family/pharmacy. Records from [facility] everywhere\" has been reviewed including progress notes, office  note, labs and investigation prior and summarized in the body of HPI    History of Present Illness:     Chief Complaint: Left-sided weakness    History obtained from patient, medical records and the ER physician. Michael Santiago is a 69 y.o. male with PMH as below presented to the ER with chief complaints of left-sided weakness and gait disturbance. According to the patient there is a lot of anxiety going on right now as his wife is admitted at [place] [address] and he did not get any good news from the  there and after that he started having left-sided weakness with gait disturbance. He was brought to the ER but he wanted to go to [facility] but due to stroke alert he was brought here. The patient said about 2 weeks ago he checked his pulse and it was 30s and he thought that the pulse ox was wrong. He denies any numbness or tingling, no palpitations. While the patient was in the ER he was noted to have heart rate in 30s. Stroke alert was called and he was admitted for further work-up. Review of Systems:    Ten point ROS reviewed negative, unless as noted above    Objective:        Intake/Output Summary (Last 24 hours) at 2/14/2023 1220  Last data filed at 2/14/2023 1200  Gross per 24 hour   Intake 124.42 ml   Output 1350 ml   Net -1225.58 ml        Recent Results (from the past 24 hour(s))   EKG 12 Lead    Collection Time: 02/13/23 11:39 PM   Result Value Ref Range    Ventricular Rate 42 BPM    QRS Duration 160 ms    Q-T Interval 504 ms    QTc Calculation (Bazett) 420 ms    R Axis -63 degrees    T Axis 33 degrees    Diagnosis       Atrial fibrillation with slow ventricular responseLeft axis deviationRight bundle branch blockAbnormal ECGWhen compared with ECG of 28-APR-2016 11:22,Atrial fibrillation has replaced Sinus rhythmQT has shortened   CBC    Collection Time: 02/13/23 11:45 PM   Result Value Ref Range    WBC 7.3 4.0 - 11.0 K/uL    RBC 5.64 4.20 - 5.90 M/uL    Hemoglobin 15.7 13.5 - 17.5 g/dL    Hematocrit 46.8 40.5 - 52.5 %    MCV 83.0 80.0 - 100.0 fL    MCH 27.9 26.0 - 34.0 pg    MCHC 33.6 31.0 - 36.0 g/dL    RDW 13.6 12.4 - 15.4 %    Platelets 459 458 - 380 K/uL    MPV 8.6 5.0 - 10.5 fL Troponin    Collection Time: 02/13/23 11:45 PM   Result Value Ref Range    Troponin <0.01 <0.01 ng/mL   Comprehensive Metabolic Panel    Collection Time: 02/13/23 11:45 PM   Result Value Ref Range    Sodium 138 136 - 145 mmol/L    Potassium 3.8 3.5 - 5.1 mmol/L    Chloride 104 99 - 110 mmol/L    CO2 22 21 - 32 mmol/L    Anion Gap 12 3 - 16    Glucose 317 (H) 70 - 99 mg/dL    BUN 26 (H) 7 - 20 mg/dL    Creatinine 0.9 0.8 - 1.3 mg/dL    Est, Glom Filt Rate >60 >60    Calcium 9.4 8.3 - 10.6 mg/dL    Total Protein 6.3 (L) 6.4 - 8.2 g/dL    Albumin 3.7 3.4 - 5.0 g/dL    Albumin/Globulin Ratio 1.4 1.1 - 2.2    Total Bilirubin 0.5 0.0 - 1.0 mg/dL    Alkaline Phosphatase 61 40 - 129 U/L    ALT 19 10 - 40 U/L    AST 13 (L) 15 - 37 U/L   Magnesium    Collection Time: 02/13/23 11:45 PM   Result Value Ref Range    Magnesium 1.60 (L) 1.80 - 2.40 mg/dL   Protime-INR    Collection Time: 02/13/23 11:45 PM   Result Value Ref Range    Protime 13.6 11.7 - 14.5 sec    INR 1.05 0.87 - 1.14   Digoxin Level    Collection Time: 02/13/23 11:45 PM   Result Value Ref Range    Digoxin Lvl 0.6 (L) 0.8 - 2.0 ng/mL   POCT Glucose    Collection Time: 02/14/23 12:07 AM   Result Value Ref Range    POC Glucose 362 (H) 70 - 99 mg/dl    Performed on ACCU-CHEK    POCT Glucose    Collection Time: 02/14/23 12:08 AM   Result Value Ref Range    Glucose 362 mg/dL    QC OK? yes    Troponin    Collection Time: 02/14/23  7:31 AM   Result Value Ref Range    Troponin <0.01 <0.01 ng/mL   Digoxin Level    Collection Time: 02/14/23  7:31 AM   Result Value Ref Range    Digoxin Lvl 0.7 (L) 0.8 - 2.0 ng/mL       Vitals:   Vitals:    02/14/23 1042   BP: (!) 163/81   Pulse: (!) 36   Resp: 15   Temp: 98.3 °F (36.8 °C)   SpO2: 97%       Physical Exam:     Gen: NAD. Eye: JUSTYN, No Icterus  Nose: Midline, No Rhinorrhea  Oropharynx: Moist Mucus membrane. Ears: B/L Symmetrical, Normal hearing.   Neck: Supple, No JVD  CVS: Bradycardic  Resp: CTAB, No end expiratory wheezing, No Crackles. Normal Resp effort. Abdo: BS+, S, NT/ND, No Guarding, No rigidity, no rebound  Neuro:  nonfocal sensory/motor exam. Normal Speech, CN II-XII grossly normal  Psych: AOX3. Anxious  Extrem: No edema  Skin: No rash, no subcutaneous nodule palpated    Past Medical History:      PMH:   Past Medical History:   Diagnosis Date    AAA (abdominal aortic aneurysm)     Ascending aortic aneurysm-4.2 cm by CT 2013    Atrial fibrillation ([facility] [address]     Cardiac arrhythmia     Diabetes mellitus ([facility] [address]     [place] (hard of hearing)     Hyperlipidemia     Hypertension     Sleep apnea     does not wear c-pap       PSHX:  has a past surgical history that includes joint replacement (Right); eye surgery (Bilateral); Colonoscopy; Tonsillectomy; and Colonoscopy (N/A, 2021). Allergies: Allergies   Allergen Reactions    Atorvastatin      Muscle aches    Penicillins Rash    Pravastatin      Leg pain       FAM HX: family history includes Heart Disease in his father; No Known Problems in his mother.     Soc HX:   Social History     Socioeconomic History    Marital status:      Spouse name: None    Number of children: None    Years of education: None    Highest education level: None   Tobacco Use    Smoking status: Former     Types: Cigarettes     Quit date: 1986     Years since quittin.8    Smokeless tobacco: Never   Vaping Use    Vaping Use: Never used   Substance and Sexual Activity    Alcohol use: Yes     Comment: 2 beers 3 days/week    Drug use: Never    Sexual activity: Yes     Partners: Female       Medications:     Medications: Reviewed    Electronically signed by Jason Owusu MD, MD on 2023 at 12:20 PM

## 2023-02-14 NOTE — CARE COORDINATION
DISCHARGE SUMMARY     DATE OF DISCHARGE: 2/14/2023    DISCHARGE DESTINATION: Direct admit to neighboring hospital.     [facility]  [address] [facility], [address] [facility]    Report Phone: [phone]  Room Number: [address] [facility] Bed [address] [facility],Third Floor    TRANSPORTATION: [address] [facility] Name:  [facility]  Dyllan Maya up Time: [id number]  Phone Number: [phone]    COMMENTS: SW met with patient and family at bedside and they are in agreement with the plan listed above. No further needs noted unless indicated by patient, family and/or medical staff. Respectfully submitted,    RAKEL Lugo  [facility]   [phone]    Electronically signed by RAKEL Lou on 2/14/2023 at 4:24 PM

## 2023-02-14 NOTE — PROGRESS NOTES
Patient admitted to room 5275 @ 0500. A&O x4, oriented to surroundings. Skin warm and dry, abrasion to L elbow, SHRUTHI. Resp easy and even on RA. HR in the 30's to 50's, appears to be flipping between afib and normal sinus with a BBB. In bed, call light in reach.

## 2023-02-14 NOTE — ED TRIAGE NOTES
Per EMS, patient complained of dizziness & weakness for 2 hours. Per EMS patient has history of Afib, on the ride over pt was in and out of Afib with a low ventricular rate. Pt received O2 and 150 cc saline bolus on EMS.  Negative NIH score, BP of 177/84

## 2023-04-18 ENCOUNTER — OFFICE VISIT (OUTPATIENT)
Dept: ORTHOPEDIC SURGERY | Age: 78
End: 2023-04-18
Payer: MEDICARE

## 2023-04-18 VITALS — WEIGHT: 263 LBS | HEIGHT: 76 IN | BODY MASS INDEX: 32.03 KG/M2

## 2023-04-18 DIAGNOSIS — M17.11 ARTHRITIS OF RIGHT KNEE: ICD-10-CM

## 2023-04-18 DIAGNOSIS — M25.562 CHRONIC PAIN OF LEFT KNEE: ICD-10-CM

## 2023-04-18 DIAGNOSIS — G89.29 CHRONIC PAIN OF LEFT KNEE: ICD-10-CM

## 2023-04-18 DIAGNOSIS — M25.561 CHRONIC PAIN OF RIGHT KNEE: Primary | ICD-10-CM

## 2023-04-18 DIAGNOSIS — G89.29 CHRONIC PAIN OF RIGHT KNEE: Primary | ICD-10-CM

## 2023-04-18 DIAGNOSIS — M17.12 ARTHRITIS OF LEFT KNEE: ICD-10-CM

## 2023-04-18 PROCEDURE — 20610 DRAIN/INJ JOINT/BURSA W/O US: CPT | Performed by: ORTHOPAEDIC SURGERY

## 2023-04-18 PROCEDURE — 99999 PR OFFICE/OUTPT VISIT,PROCEDURE ONLY: CPT | Performed by: ORTHOPAEDIC SURGERY

## 2023-04-18 RX ORDER — SENNA PLUS 8.6 MG/1
2 TABLET ORAL 2 TIMES DAILY PRN
COMMUNITY
Start: 2023-02-20

## 2023-04-18 RX ORDER — METOPROLOL SUCCINATE 100 MG/1
TABLET, EXTENDED RELEASE ORAL
COMMUNITY
Start: 2023-03-15

## 2023-04-18 RX ORDER — APIXABAN 5 MG/1
TABLET, FILM COATED ORAL
COMMUNITY
Start: 2023-03-15

## 2023-04-18 RX ORDER — POLYETHYLENE GLYCOL 3350 17 G/17G
17 POWDER, FOR SOLUTION ORAL DAILY PRN
COMMUNITY
Start: 2023-02-27

## 2023-04-18 RX ORDER — ACETAMINOPHEN 500 MG
500 TABLET ORAL EVERY 4 HOURS PRN
COMMUNITY
Start: 2023-02-20

## 2023-04-18 RX ORDER — GLIMEPIRIDE 4 MG/1
4 TABLET ORAL EVERY MORNING
COMMUNITY
Start: 2023-03-21

## 2023-04-18 RX ORDER — LISINOPRIL 40 MG/1
40 TABLET ORAL DAILY
COMMUNITY
Start: 2023-03-15

## 2023-04-18 RX ORDER — METFORMIN HYDROCHLORIDE 500 MG/1
1000 TABLET, EXTENDED RELEASE ORAL 2 TIMES DAILY WITH MEALS
COMMUNITY
Start: 2023-03-01 | End: 2023-04-18

## 2023-04-18 NOTE — PROGRESS NOTES
Mark Russo returns today with complaints of bilateral knee pain  We did bilateral Zilretta injections in July 2022. Tremendously helpful until recently. Unfortunately, he had stroke back in February and his wife passed away in early March. His knee pain has returned. Left is worse than right and is about 5 out of 10. General Exam:    Vitals: There were no vitals taken for this visit. Constitutional: Patient is adequately groomed with no evidence of malnutrition  Mental Status: The patient is oriented to time, place and person. The patient's mood and affect are appropriate. He walks with a cane and uses an AFO on the right ankle. Bilateral hips are a bit stiff. Right knee range of motion is 0 to about 110 degrees. Left is 5 to about 110. He has valgus deformity on the right and varus on the left. He has severe crepitation and moderate joint line pain laterally on the right  and medial on the left. He is grossly stable. X-rays from [facility] AP standing, PA flex views of both knees and merchant and lateral of the left knee show:  lateral compartment arthritis of the right knee and medial compartment arthritis of the left with severe patellofemoral changes well. 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 Plan: We will use Zilretta injections again. This is a markedly safer option given his poorly controlled diabetes and multiple medical problems. They were successful last year. We will do the left knee today and right knee next week. Procedure: After obtaining verbal consent under sterile technique left knee was injected into the anterolateral arthroscopy portal with 32 mg of Zilretta. He tolerated that well.   Follow-up next week for injection of the right knee

## 2023-04-25 ENCOUNTER — OFFICE VISIT (OUTPATIENT)
Dept: ORTHOPEDIC SURGERY | Age: 78
End: 2023-04-25
Payer: MEDICARE

## 2023-04-25 VITALS — BODY MASS INDEX: 32.03 KG/M2 | HEIGHT: 76 IN | WEIGHT: 263 LBS

## 2023-04-25 DIAGNOSIS — M25.561 CHRONIC PAIN OF RIGHT KNEE: ICD-10-CM

## 2023-04-25 DIAGNOSIS — G89.29 CHRONIC PAIN OF RIGHT KNEE: ICD-10-CM

## 2023-04-25 DIAGNOSIS — M17.11 ARTHRITIS OF RIGHT KNEE: Primary | ICD-10-CM

## 2023-04-25 PROCEDURE — 20610 DRAIN/INJ JOINT/BURSA W/O US: CPT | Performed by: ORTHOPAEDIC SURGERY

## 2023-04-25 PROCEDURE — 99999 PR OFFICE/OUTPT VISIT,PROCEDURE ONLY: CPT | Performed by: ORTHOPAEDIC SURGERY

## 2023-04-25 RX ORDER — METFORMIN HYDROCHLORIDE 500 MG/1
TABLET, EXTENDED RELEASE ORAL
COMMUNITY
Start: 2023-04-21

## 2023-04-25 RX ORDER — GLUCOSAMINE HCL/CHONDROITIN SU 500-400 MG
CAPSULE ORAL
COMMUNITY
Start: 2023-04-21

## 2023-04-25 RX ORDER — DULAGLUTIDE 0.75 MG/.5ML
0.75 INJECTION, SOLUTION SUBCUTANEOUS
COMMUNITY
Start: 2023-04-21 | End: 2023-05-19

## 2023-04-25 NOTE — PROGRESS NOTES
Queen Ollie returns today for his right knee injection. We did the left knee last week. He notices no pain in the left knee today and is pleased. Right knee pain is about 4 or 5 out of 10. Range of motion of the right knee is 0 to about 110 degrees. Left is 5 to about 110. He has valgus deformity on the right and varus on the left. He has severe crepitation and moderate joint line pain laterally on the right  and medial on the left. He is grossly stable. X-rays from [facility] AP standing, PA flex views of both knees and merchant and lateral of the left knee show:  lateral compartment arthritis of the right knee and medial compartment arthritis of the left with severe patellofemoral changes well. 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 Left was injected last week. Plan: We will use Zilretta injections again. This is a markedly safer option given his poorly controlled diabetes and multiple medical problems. They were successful last year. We will do the right knee today     Procedure: After obtaining verbal consent under sterile technique right knee was injected into the anterolateral arthroscopy portal with 32 mg of Zilretta. He tolerated that well.

## 2023-05-09 ENCOUNTER — OFFICE VISIT (OUTPATIENT)
Dept: ORTHOPEDIC SURGERY | Age: 78
End: 2023-05-09

## 2023-05-09 VITALS — RESPIRATION RATE: 12 BRPM | WEIGHT: 263 LBS | BODY MASS INDEX: 32.03 KG/M2 | HEIGHT: 76 IN

## 2023-05-09 DIAGNOSIS — M12.812 ROTATOR CUFF TEAR ARTHROPATHY OF LEFT SHOULDER: ICD-10-CM

## 2023-05-09 DIAGNOSIS — G89.29 CHRONIC LEFT SHOULDER PAIN: Primary | ICD-10-CM

## 2023-05-09 DIAGNOSIS — M25.512 CHRONIC LEFT SHOULDER PAIN: Primary | ICD-10-CM

## 2023-05-09 DIAGNOSIS — M75.102 ROTATOR CUFF TEAR ARTHROPATHY OF LEFT SHOULDER: ICD-10-CM

## 2023-05-09 RX ORDER — LIDOCAINE HYDROCHLORIDE 10 MG/ML
4 INJECTION, SOLUTION INFILTRATION; PERINEURAL ONCE
Status: COMPLETED | OUTPATIENT
Start: 2023-05-09 | End: 2023-05-09

## 2023-05-09 RX ORDER — AMLODIPINE BESYLATE 10 MG/1
TABLET ORAL
COMMUNITY
Start: 2023-04-27

## 2023-05-09 RX ORDER — METHYLPREDNISOLONE ACETATE 40 MG/ML
80 INJECTION, SUSPENSION INTRA-ARTICULAR; INTRALESIONAL; INTRAMUSCULAR; SOFT TISSUE ONCE
Status: COMPLETED | OUTPATIENT
Start: 2023-05-09 | End: 2023-05-09

## 2023-05-09 RX ADMIN — METHYLPREDNISOLONE ACETATE 80 MG: 40 INJECTION, SUSPENSION INTRA-ARTICULAR; INTRALESIONAL; INTRAMUSCULAR; SOFT TISSUE at 10:11

## 2023-05-09 RX ADMIN — LIDOCAINE HYDROCHLORIDE 4 ML: 10 INJECTION, SOLUTION INFILTRATION; PERINEURAL at 10:08

## 2023-05-09 NOTE — PROGRESS NOTES
Jannet Roberts is seen today for chronic pain involving his left shoulder. He thinks its been hurting for many years but its been particularly bad since October. He does not recall any other injuries. He had a pacemaker placed and is doing some therapy at home and is lifting his arm overhead causes pain that is 5 out of 10. It pops. He uses Tylenol PM and CBD oil. History: Patient's relevant past family, medical, and social history are reviewed as part of today's visit. ROS of pertinent positives and negatives as above; otherwise negative. General Exam:    Vitals: Resp. rate 12, height 6' 4\" (1.93 m), weight 263 lb (119.3 kg). Constitutional: Patient is adequately groomed with no evidence of malnutrition  Mental Status: The patient is oriented to time, place and person. The patient's mood and affect are appropriate. Gait:  Patient walks with a cane. Lymphatic: The lymphatic examination bilaterally reveals all areas to be without enlargement or induration. Vascular: Examination reveals no swelling or calf tenderness. Peripheral pulses are palpable and 2+. Neurological: The patient has good coordination. There is no weakness or sensory deficit. Skin:    Head/Neck: inspection reveals no rashes, ulcerations or lesions. Trunk:  inspection reveals no rashes, ulcerations or lesions. Right Lower Extremity: inspection reveals no rashes, ulcerations or lesions. Left Lower Extremity: inspection reveals no rashes, ulcerations or lesions. Right shoulder can elevate 230 degrees. He has good strength in the cuff. Left shoulder has atrophy throughout the cuff. Elevation is 80 degrees. External rotation strength is 3-/5. He has audible crepitation throughout. Xrays of the left shoulder were obtained today in the office and interpreted by me. AP in the scapular plane, axillary lateral, and scapular Y.   These demonstrate:  Bone-on-bone glenohumeral arthritis with proximal

## 2024-01-02 ENCOUNTER — OFFICE VISIT (OUTPATIENT)
Dept: ORTHOPEDIC SURGERY | Age: 79
End: 2024-01-02
Payer: MEDICARE

## 2024-01-02 VITALS — BODY MASS INDEX: 32.03 KG/M2 | WEIGHT: 263 LBS | HEIGHT: 76 IN

## 2024-01-02 DIAGNOSIS — M25.561 CHRONIC PAIN OF RIGHT KNEE: ICD-10-CM

## 2024-01-02 DIAGNOSIS — M21.061 ACQUIRED GENU VALGUM OF RIGHT KNEE: ICD-10-CM

## 2024-01-02 DIAGNOSIS — M25.562 CHRONIC PAIN OF LEFT KNEE: ICD-10-CM

## 2024-01-02 DIAGNOSIS — G89.29 CHRONIC PAIN OF RIGHT KNEE: ICD-10-CM

## 2024-01-02 DIAGNOSIS — G89.29 CHRONIC PAIN OF LEFT KNEE: ICD-10-CM

## 2024-01-02 DIAGNOSIS — M17.12 ARTHRITIS OF LEFT KNEE: ICD-10-CM

## 2024-01-02 DIAGNOSIS — M17.11 ARTHRITIS OF RIGHT KNEE: Primary | ICD-10-CM

## 2024-01-02 DIAGNOSIS — M21.162 ACQUIRED VARUS DEFORMITY KNEE, LEFT: ICD-10-CM

## 2024-01-02 PROCEDURE — 99999 PR OFFICE/OUTPT VISIT,PROCEDURE ONLY: CPT | Performed by: ORTHOPAEDIC SURGERY

## 2024-01-02 PROCEDURE — 20610 DRAIN/INJ JOINT/BURSA W/O US: CPT | Performed by: ORTHOPAEDIC SURGERY

## 2024-01-02 NOTE — PROGRESS NOTES
Kirby Dennison returns today with bilateral knee pain.  He is accompanied today by his son.  He says his left knee hurts much more than the right.  The injections we gave him back in May were helpful until \"recently\".       Because of his diabetes we have been using Zilretta.      General Exam:     Vitals: Height 6' 4\" (1.93 m), weight 273 lb (123.8 kg).  Constitutional: Patient is adequately groomed with no evidence of malnutrition  Mental Status: The patient is oriented to time, place and person.  The patient's mood and affect are appropriate.        Bilateral hips are a bit stiff.      Right knee range of motion is 0 to about 110 degrees.  Left is 5 to about 110.  Valgus deformity on the right and varus on the left.  He has severe crepitation and moderate joint line pain lateral and right medial on the left.     He is grossly stable.     X-rays from [facility] are again reviewed and show AP standing, PA flex views of both knees and merchant and lateral of the left knee show lateral compartment arthritis of the right knee and medial compartment arthritis of the left with severe patellofemoral changes well.       Most recent hemoglobin A1c dated 11/10/2023 was 7.6     Assessment: End-stage arthritic change bilateral knees        Plan: Bilateral Zilretta injections.     Procedure: Under sterile technique, right knee was injected into the anterolateral arthroscopy portal with 32 mg of Zilretta.     Under sterile technique the left knee was injected into the anterolateral arthroscopy portal with 32 mg of Zilretta.     He tolerated both shots well.  If those are effective we can safely repeat them in about 4 months.     He is aware that the only permanent solution for his problem would be arthroplasty.

## 2024-08-14 NOTE — PROGRESS NOTES
Kirby Dennison returns today with bilateral knee pain.    We did Zilretta injections back on January 2.  He says those were helpful until recently.  Left knee hurts more than the right in general and pain can be about 4 out of 10.  He has pain with stairs.  He uses a cane to help with his balance more than his pain.       Because of his diabetes we have been using Zilretta.           General Exam:     Vitals: Height 6' 4\" (1.93 m), weight 273 lb (123.8 kg).  Constitutional: Patient is adequately groomed with no evidence of malnutrition  Mental Status: The patient is oriented to time, place and person.  The patient's mood and affect are appropriate.        Bilateral hips are a bit stiff.       Right knee range of motion is 0 to about 110 degrees.  Left is 5 to about 110.  Valgus deformity on the right and varus on the left.  He has severe crepitation and moderate joint line pain lateral and right medial on the left.     He is grossly stable.     Bilateral knee x-rays were obtained today and interpreted by me AP standing, PA flex, merchant, lateral views of both knees.  These demonstrate: [place] arthritic change with lateral compartment disease of the right knee that is bone-on-bone medial compartment disease of the left that is bone-on-bone with bilateral patellofemoral bone-on-bone change.        Most recent hemoglobin A1c dated 6/3/2024 is 8.6.     Assessment: End-stage arthritic change bilateral knees          Plan: Bilateral Zilretta injections.     Procedure: Under sterile technique, right knee was injected into the anterior medial arthroscopy portal with 32 mg of Zilretta.     Under sterile technique the left knee was injected into the anterolateral arthroscopy portal with 32 mg of Zilretta.     He tolerated both shots well.  If those are effective we can safely repeat them in about 4 months.     He is aware that the only permanent solution for his problem would be arthroplasty.

## 2024-08-15 ENCOUNTER — OFFICE VISIT (OUTPATIENT)
Dept: ORTHOPEDIC SURGERY | Age: 79
End: 2024-08-15

## 2024-08-15 VITALS — HEIGHT: 76 IN | WEIGHT: 263 LBS | BODY MASS INDEX: 32.03 KG/M2

## 2024-08-15 DIAGNOSIS — M25.562 CHRONIC PAIN OF LEFT KNEE: ICD-10-CM

## 2024-08-15 DIAGNOSIS — G89.29 CHRONIC PAIN OF RIGHT KNEE: Primary | ICD-10-CM

## 2024-08-15 DIAGNOSIS — G89.29 CHRONIC PAIN OF LEFT KNEE: ICD-10-CM

## 2024-08-15 DIAGNOSIS — M17.12 ARTHRITIS OF LEFT KNEE: ICD-10-CM

## 2024-08-15 DIAGNOSIS — M17.11 ARTHRITIS OF RIGHT KNEE: ICD-10-CM

## 2024-08-15 DIAGNOSIS — M25.561 CHRONIC PAIN OF RIGHT KNEE: Primary | ICD-10-CM

## 2024-08-15 RX ORDER — VALSARTAN 160 MG/1
160 TABLET ORAL DAILY
COMMUNITY
Start: 2024-08-06 | End: 2025-08-01

## 2024-08-15 RX ORDER — TRAZODONE HYDROCHLORIDE 50 MG/1
50 TABLET ORAL NIGHTLY
COMMUNITY
Start: 2024-03-05

## 2024-10-09 NOTE — PROGRESS NOTES
[facility] PRE-OPERATIVE INSTRUCTIONS    Day of Procedure:    10/31/24            Arrival time:  0630       Surgery time: 0800    Take the following medications with a sip of water:  Follow your MD/Surgeons pre-procedure instructions regarding your medications     Do not eat or drink anything after 12:00 midnight prior to your surgery.  This includes water chewing gum, mints and ice chips.   You may brush your teeth and gargle the morning of your surgery, but do not swallow the water     Please see your family doctor/pediatrician for a history and physical and/or concerning medications.   Bring any test results/reports from your physicians office.   If you are under the care of a heart doctor or specialist doctor, please be aware that you may be asked to them for clearance    You may be asked to stop blood thinners such as Coumadin, Plavix, Fragmin, Lovenox, etc., or any anti-inflammatories such as:  Aspirin, Ibuprofen, Advil, Naproxen prior to your surgery.    We also ask that you stop any OTC medications such as fish oil, vitamin E, glucosamine, garlic, Multivitamins, COQ 10, etc.    We ask that you do not smoke 24 hours prior to surgery  We ask that you do not  drink any alcoholic beverages 24 hours prior to surgery     You must make arrangements for a responsible adult to take you home after your surgery.    For your safety you will not be allowed to leave alone or drive yourself home.  Your surgery will be cancelled if you do not have a ride home.     Also for your safety, you must have someone stay with you the first 24 hours after your surgery.     A parent or legal guardian must accompany a child scheduled for surgery and plan to stay at the hospital until the child is discharged.    Please do not bring other children with you.    For your comfort, please wear simple loose fitting clothing to the hospital.  Please do not bring valuables.    Do not wear any make-up or nail polish on your fingers

## 2024-10-09 NOTE — PROGRESS NOTES
[facility] Pre-Admission Testing Electronic Communication Worksheet for OR/ENDO Procedures        Patient: Kirby Dennison    DOS: 10/31/24    Transportation Confirmed: [x] YES    []  NO    History and Physical:  [] YES    []  NO  [] N/A  If yes, please list doctor or Urgent Care and date of H&P: H&P to be done DOP per Dr Gaitan    Additional Clearance(Cardiac, Pulmonary, etc):  [] YES    [x]  NO    Pre-Admission Testing Visit:  [] YES    [x]  NO If no, do labs/testing need to be done DOS?  [] YES    []  NO    Medication Reconciliation Complete:  [x] YES    []  NO        Additional Notes:  Mark Dennison(brother) may share medical info    Patient has sleep apnea--does not use Cpap machine  Pacemaker-[employer]  History of A-fib  Fall risk    Interview Complete: [x] YES    []  NO          Alejandrina Reynolds RN  11:40 AM

## 2024-10-30 ENCOUNTER — ANESTHESIA EVENT (OUTPATIENT)
Dept: ENDOSCOPY | Age: 79
End: 2024-10-30
Payer: MEDICARE

## 2024-10-31 ENCOUNTER — APPOINTMENT (OUTPATIENT)
Dept: ENDOSCOPY | Age: 79
End: 2024-10-31
Attending: INTERNAL MEDICINE
Payer: MEDICARE

## 2024-10-31 ENCOUNTER — ANESTHESIA (OUTPATIENT)
Dept: ENDOSCOPY | Age: 79
End: 2024-10-31
Payer: MEDICARE

## 2024-10-31 ENCOUNTER — HOSPITAL ENCOUNTER (OUTPATIENT)
Age: 79
Setting detail: OUTPATIENT SURGERY
Discharge: HOME | End: 2024-10-31
Attending: INTERNAL MEDICINE | Admitting: INTERNAL MEDICINE
Payer: MEDICARE

## 2024-10-31 VITALS
DIASTOLIC BLOOD PRESSURE: 93 MMHG | OXYGEN SATURATION: 95 % | BODY MASS INDEX: 31.66 KG/M2 | SYSTOLIC BLOOD PRESSURE: 140 MMHG | HEIGHT: 76 IN | TEMPERATURE: 97.2 F | WEIGHT: 260 LBS | RESPIRATION RATE: 15 BRPM | HEART RATE: 64 BPM

## 2024-10-31 DIAGNOSIS — R13.14 DYSPHAGIA, PHARYNGOESOPHAGEAL PHASE: ICD-10-CM

## 2024-10-31 LAB
GLUCOSE BLD-MCNC: 134 MG/DL (ref 70–99)
GLUCOSE BLD-MCNC: 141 MG/DL (ref 70–99)
PERFORMED ON: ABNORMAL
PERFORMED ON: ABNORMAL

## 2024-10-31 PROCEDURE — 2709999900 HC NON-CHARGEABLE SUPPLY: Performed by: INTERNAL MEDICINE

## 2024-10-31 PROCEDURE — 3609015300 HC ESOPHAGEAL DILATION MALONEY: Performed by: INTERNAL MEDICINE

## 2024-10-31 PROCEDURE — 3609012400 HC EGD TRANSORAL BIOPSY SINGLE/MULTIPLE: Performed by: INTERNAL MEDICINE

## 2024-10-31 PROCEDURE — 88305 TISSUE EXAM BY PATHOLOGIST: CPT

## 2024-10-31 PROCEDURE — 2580000003 HC RX 258: Performed by: ANESTHESIOLOGY

## 2024-10-31 PROCEDURE — 6360000002 HC RX W HCPCS

## 2024-10-31 PROCEDURE — 2500000003 HC RX 250 WO HCPCS

## 2024-10-31 PROCEDURE — 7100000010 HC PHASE II RECOVERY - FIRST 15 MIN: Performed by: INTERNAL MEDICINE

## 2024-10-31 PROCEDURE — 3700000001 HC ADD 15 MINUTES (ANESTHESIA): Performed by: INTERNAL MEDICINE

## 2024-10-31 PROCEDURE — 7100000000 HC PACU RECOVERY - FIRST 15 MIN: Performed by: INTERNAL MEDICINE

## 2024-10-31 PROCEDURE — 7100000001 HC PACU RECOVERY - ADDTL 15 MIN: Performed by: INTERNAL MEDICINE

## 2024-10-31 PROCEDURE — 3700000000 HC ANESTHESIA ATTENDED CARE: Performed by: INTERNAL MEDICINE

## 2024-10-31 PROCEDURE — 7100000011 HC PHASE II RECOVERY - ADDTL 15 MIN: Performed by: INTERNAL MEDICINE

## 2024-10-31 RX ORDER — SODIUM CHLORIDE 0.9 % (FLUSH) 0.9 %
5-40 SYRINGE (ML) INJECTION EVERY 12 HOURS SCHEDULED
Status: DISCONTINUED | OUTPATIENT
Start: 2024-10-31 | End: 2024-10-31 | Stop reason: HOSPADM

## 2024-10-31 RX ORDER — NALOXONE HYDROCHLORIDE 0.4 MG/ML
INJECTION, SOLUTION INTRAMUSCULAR; INTRAVENOUS; SUBCUTANEOUS PRN
Status: DISCONTINUED | OUTPATIENT
Start: 2024-10-31 | End: 2024-10-31 | Stop reason: HOSPADM

## 2024-10-31 RX ORDER — SODIUM CHLORIDE 0.9 % (FLUSH) 0.9 %
5-40 SYRINGE (ML) INJECTION PRN
Status: DISCONTINUED | OUTPATIENT
Start: 2024-10-31 | End: 2024-10-31 | Stop reason: HOSPADM

## 2024-10-31 RX ORDER — SODIUM CHLORIDE 9 MG/ML
INJECTION, SOLUTION INTRAVENOUS PRN
Status: DISCONTINUED | OUTPATIENT
Start: 2024-10-31 | End: 2024-10-31 | Stop reason: HOSPADM

## 2024-10-31 RX ORDER — PROPOFOL 10 MG/ML
INJECTION, EMULSION INTRAVENOUS
Status: DISCONTINUED | OUTPATIENT
Start: 2024-10-31 | End: 2024-10-31 | Stop reason: SDUPTHER

## 2024-10-31 RX ORDER — LIDOCAINE HYDROCHLORIDE 20 MG/ML
INJECTION, SOLUTION EPIDURAL; INFILTRATION; INTRACAUDAL; PERINEURAL
Status: DISCONTINUED | OUTPATIENT
Start: 2024-10-31 | End: 2024-10-31 | Stop reason: SDUPTHER

## 2024-10-31 RX ADMIN — PROPOFOL 100 MG: 10 INJECTION, EMULSION INTRAVENOUS at 08:13

## 2024-10-31 RX ADMIN — SODIUM CHLORIDE, PRESERVATIVE FREE 10 ML: 5 INJECTION INTRAVENOUS at 08:21

## 2024-10-31 RX ADMIN — LIDOCAINE HYDROCHLORIDE 50 MG: 20 INJECTION, SOLUTION EPIDURAL; INFILTRATION; INTRACAUDAL; PERINEURAL at 08:13

## 2024-10-31 RX ADMIN — PROPOFOL 20 MG: 10 INJECTION, EMULSION INTRAVENOUS at 08:20

## 2024-10-31 RX ADMIN — PROPOFOL 50 MG: 10 INJECTION, EMULSION INTRAVENOUS at 08:17

## 2024-10-31 ASSESSMENT — PAIN - FUNCTIONAL ASSESSMENT
PAIN_FUNCTIONAL_ASSESSMENT: 0-10
PAIN_FUNCTIONAL_ASSESSMENT: NONE - DENIES PAIN
PAIN_FUNCTIONAL_ASSESSMENT: 0-10

## 2024-10-31 NOTE — PROGRESS NOTES
Pt more awake and talking, readjusted to sit up in bed, denies pain or nausea at this time, abdo soft to touch, given ice chips at request.

## 2024-10-31 NOTE — ANESTHESIA PRE PROCEDURE
Department of Anesthesiology  Preprocedure Note       Name:  Kirby Dennison   Age:  79 y.o.  :  1945                                          MRN:  [id number]         Date:  10/31/2024      Surgeon: Surgeon(s):  Silvia Gaitan MD    Procedure: Procedure(s):  ESOPHAGOGASTRODUODENOSCOPY    Medications prior to admission:   Prior to Admission medications    Medication Sig Start Date End Date Taking? Authorizing Provider   ibuprofen (ADVIL;MOTRIN) 200 MG tablet Take 2 tablets by mouth every 6 hours as needed for Pain    Ralph Kessler MD   valsartan (DIOVAN) 160 MG tablet Take 1 tablet by mouth daily 24  Ralph Kessler MD   traZODone (DESYREL) 50 MG tablet Take 1 tablet by mouth nightly 3/5/24   Ralph Kessler MD   amLODIPine (NORVASC) 10 MG tablet Take 1 tablet by mouth daily 23   Ralph Kessler MD   metFORMIN (GLUCOPHAGE-XR) 500 MG extended release tablet Take 2 tablets by mouth in the morning and at bedtime 23   Ralph Kessler MD   ELIQUIS 5 MG TABS tablet Take 1 tablet by mouth 2 times daily 3/15/23   Ralph Kessler MD   senna (SENOKOT) 8.6 MG tablet Take 2 tablets by mouth 2 times daily as needed 23   Ralph Kessler MD   SITagliptin (JANUVIA) 100 MG tablet Take 1 tablet by mouth daily Takes if Blood sugar over 140 in am    Ralph Kessler MD   rosuvastatin (CRESTOR) 40 MG tablet Take 1 tablet by mouth every evening    Ralph Kessler MD   ONE TOUCH ULTRASOFT LANCETS MISC USE DAILY AS DIRECTED 22   Ralph Kessler MD   ergocalciferol (ERGOCALCIFEROL) 1.25 MG (60034 UT) capsule TAKE 1 CAPSULE BY MOUTH EVERY 7 DAYS 22   Ralph Kessler MD       Current medications:    No current facility-administered medications for this encounter.     Current Outpatient Medications   Medication Sig Dispense Refill    ibuprofen (ADVIL;MOTRIN) 200 MG tablet Take 2 tablets by mouth every 6 hours as needed for Pain

## 2024-10-31 NOTE — ANESTHESIA POSTPROCEDURE EVALUATION
Department of Anesthesiology  Postprocedure Note    Patient: Kirby Dennison  MRN: [id number]  YOB: 1945  Date of evaluation: 10/31/2024    Procedure Summary       Date: 10/31/24 Room / Location: [place] [address] / [facility]    Anesthesia Start: 0804 Anesthesia Stop: 0829    Procedures:       ESOPHAGOGASTRODUODENOSCOPY BIOPSY      ESOPHAGEAL DILATION ROLA Diagnosis:       Dysphagia, pharyngoesophageal phase      (Dysphagia, pharyngoesophageal phase [R13.14])    Surgeons: Silvia Gaitan MD Responsible Provider: Nga Pena MD    Anesthesia Type: MAC ASA Status: 3            Anesthesia Type: No value filed.    Ekaterina Phase I: Ekaterina Score: 10    Ekaterina Phase II: Ekaterina Score: 10    Anesthesia Post Evaluation    Patient location during evaluation: bedside  Patient participation: complete - patient participated  Level of consciousness: awake and alert  Pain score: 0  Airway patency: patent  Nausea & Vomiting: no vomiting  Cardiovascular status: blood pressure returned to baseline  Respiratory status: acceptable  Hydration status: euvolemic  Pain management: adequate    No notable events documented.

## 2024-10-31 NOTE — PROGRESS NOTES
Patient tolerating food and drink well. Patient does not have any complaints of nausea.  Patient up to chair with no complications. Patient has no complaints of dizziness at this time.

## 2024-10-31 NOTE — PROGRESS NOTES
Pt to pacu from ENDO. Pt asleep and laying on left side at arrival, on RA per CRNA. Placed on monitor, vss. Abdo soft to touch, no pain with palpation. No signs of distress noted at this time, report obtained. Glucose 134.

## 2024-10-31 NOTE — PROGRESS NOTES
Pt sitting up, chatting with writer, still denies pain or nausea at this time, states feels ready to move to phase 2, vss. Tolerating ice chips, no signs of distress noted at this time..

## 2024-10-31 NOTE — H&P
including postponing the procedure were discussed. The patient does wish to proceed with the procedure at this time.      Silvia Gaitan MD  10/31/2024

## 2024-10-31 NOTE — DISCHARGE INSTRUCTIONS
Your have some evidence of gastritis (inflammation of the stomach).       Avoid NSAIDs (such as ibuprofen, aleve, motrin).   Start Omeprazole 40 mg daily   Await biopsy results   The patient had biopsies taken today.  The patient should check patient portal for results in 7 days and call us if they have not heard from our office within 14 days.  Our number is [phone].        Endoscopy Discharge Instructions      You may be drowsy or lightheaded after receiving sedation.  DO NOT operate  a vehicle (automobile, bicycle, motorcycle, machinery, or power tools), no  alcoholic beverages, and do not make any important decisions today.                 Plan on bed rest or quiet relaxation today.  Resume normal activities in the morning.     Resume normal activity tomorrow unless otherwise advised by your physician.            Eat a light first meal, avoiding spicy and fatty foods, then resume normal diet unless  you are told otherwise by your physician.    If the intravenous medication site is painful, apply warm compresses on the site until the soreness is relieved and elevate the arm above the heart. Call your physician if no improvement  in 2-3 days.       POSSIBLE SYMPTOMS TO WATCH:     1. fever (greater than 100) 5. increased abdominal bloating   2. severe pain   6. excessive bleeding   3. nausea and vomiting  7. chest pain   4. chills    8. shortness of breath       Expected as normal and remedies:  Sore throat: use over the counter throat lozenges or gargle with warm salt water.  Redness or soreness at the IV site: apply warm compress  Gaseous discomfort: belching or passing flatus (gas).

## 2024-11-26 ENCOUNTER — OFFICE VISIT (OUTPATIENT)
Dept: PULMONOLOGY | Age: 79
End: 2024-11-26
Payer: MEDICARE

## 2024-11-26 VITALS
SYSTOLIC BLOOD PRESSURE: 120 MMHG | BODY MASS INDEX: 31.66 KG/M2 | WEIGHT: 260 LBS | TEMPERATURE: 96.9 F | HEART RATE: 64 BPM | HEIGHT: 76 IN | RESPIRATION RATE: 18 BRPM | OXYGEN SATURATION: 99 % | DIASTOLIC BLOOD PRESSURE: 80 MMHG

## 2024-11-26 DIAGNOSIS — J84.10 PULMONARY FIBROSIS (HCC): ICD-10-CM

## 2024-11-26 DIAGNOSIS — Z87.891 HISTORY OF TOBACCO ABUSE: ICD-10-CM

## 2024-11-26 DIAGNOSIS — J96.01 ACUTE RESPIRATORY FAILURE WITH HYPOXIA: ICD-10-CM

## 2024-11-26 DIAGNOSIS — J84.10 PULMONARY FIBROSIS (HCC): Primary | ICD-10-CM

## 2024-11-26 LAB
CRP SERPL-MCNC: <3 MG/L (ref 0–5.1)
ERYTHROCYTE [SEDIMENTATION RATE] IN BLOOD BY WESTERGREN METHOD: 14 MM/HR (ref 0–20)
RHEUMATOID FACT SER IA-ACNC: <10 IU/ML

## 2024-11-26 PROCEDURE — G8484 FLU IMMUNIZE NO ADMIN: HCPCS | Performed by: INTERNAL MEDICINE

## 2024-11-26 PROCEDURE — G8427 DOCREV CUR MEDS BY ELIG CLIN: HCPCS | Performed by: INTERNAL MEDICINE

## 2024-11-26 PROCEDURE — G8417 CALC BMI ABV UP PARAM F/U: HCPCS | Performed by: INTERNAL MEDICINE

## 2024-11-26 PROCEDURE — 1159F MED LIST DOCD IN RCRD: CPT | Performed by: INTERNAL MEDICINE

## 2024-11-26 PROCEDURE — 1123F ACP DISCUSS/DSCN MKR DOCD: CPT | Performed by: INTERNAL MEDICINE

## 2024-11-26 PROCEDURE — 1036F TOBACCO NON-USER: CPT | Performed by: INTERNAL MEDICINE

## 2024-11-26 PROCEDURE — 99204 OFFICE O/P NEW MOD 45 MIN: CPT | Performed by: INTERNAL MEDICINE

## 2024-11-26 RX ORDER — ALBUTEROL SULFATE 0.83 MG/ML
2.5 SOLUTION RESPIRATORY (INHALATION) EVERY 4 HOURS PRN
COMMUNITY
Start: 2024-11-08 | End: 2024-11-26

## 2024-11-26 RX ORDER — BUDESONIDE AND FORMOTEROL FUMARATE DIHYDRATE 80; 4.5 UG/1; UG/1
2 AEROSOL RESPIRATORY (INHALATION) 2 TIMES DAILY
COMMUNITY
Start: 2024-11-08

## 2024-11-26 RX ORDER — BISACODYL 5 MG/1
5 TABLET, DELAYED RELEASE ORAL DAILY PRN
COMMUNITY

## 2024-11-26 RX ORDER — GUAIFENESIN 600 MG/1
1200 TABLET, EXTENDED RELEASE ORAL 2 TIMES DAILY
COMMUNITY

## 2024-11-26 RX ORDER — FUROSEMIDE 20 MG/1
20 TABLET ORAL PRN
COMMUNITY

## 2024-11-26 RX ORDER — OMEPRAZOLE 40 MG/1
40 CAPSULE, DELAYED RELEASE ORAL DAILY
COMMUNITY

## 2024-11-26 NOTE — PROGRESS NOTES
PATIENT IS SEEN AT THE REQUEST OF DR. Briscoe for cough, pneumonia     Chief complaint  This is a 79 y.o. year old male  who comes to see me with a chief complaint of   Chief Complaint   Patient presents with    Establish Care    Pneumonia       HPI  Here with a cc of cough, recent pneumonia, COVID    He has had a chronic cough for some time and had requested eval by pulmonary.  He was never evaluated.  Had previous CT scans that suggested some mild fibrosis but never had PFT.  He was recently admitted to [facility] for hypoxia and pneumonia. He tested positive for COVID and was sent home on oxygen.  Imaging suggested some progression of fibrosis.  Once again no PFT on file.  He does not endorse chronic SOB but does have chronic cough.  He said his wife  from some form of lung disease which may have been pulmonary fibrosis.  He quit tobacco many years ago.  His daughter is present in person and one daughter is present on the phone     Occupation:  [employer] for 22 years,      Pets: None    Hobbies/Exposures: None    TB Exposure:  None    Lung Procedures:  None      Past Medical History:   Diagnosis Date    AAA (abdominal aortic aneurysm) ([facility])     Ascending aortic aneurysm-4.2 cm by CT 2013    Atrial fibrillation ([facility])     Balance problem     uses a cane/walker    Cardiac arrhythmia     Diabetes mellitus (HCC)     Foot drop, right     [place] (hard of hearing)     Hyperlipidemia     Hypertension     Interstitial lung disease (HCC)     enviromental exposure    Pacemaker     [employer]    Sleep apnea     does not wear c-pap    Wears glasses        Past Surgical History:   Procedure Laterality Date    BACK SURGERY      fusion L1,3,4,5    COLONOSCOPY      polyps    COLONOSCOPY N/A 2021    COLONOSCOPY POLYPECTOMY SNARE/COLD BIOPSY performed by Kulwant Sanders Jr., DO at [facility]    ESOPHAGEAL DILATATION  10/31/2024    ESOPHAGEAL DILATION CANELA performed by Silvia Gaitan MD at [facility]

## 2024-11-26 NOTE — PATIENT INSTRUCTIONS
Blood work today    Full breathing studies    I will try to obtain CT scans    Walk test     Follow up in 1 month

## 2024-11-27 LAB — ANA SER QL IA: NEGATIVE

## 2024-12-06 LAB — CCP IGG SERPL-ACNC: <0.5 U/ML (ref 0–2.9)

## 2024-12-10 ENCOUNTER — HOSPITAL ENCOUNTER (OUTPATIENT)
Dept: CARDIAC REHAB | Age: 79
Setting detail: THERAPIES SERIES
Discharge: HOME | End: 2024-12-10
Payer: MEDICARE

## 2024-12-10 DIAGNOSIS — J84.10 PULMONARY FIBROSIS (HCC): ICD-10-CM

## 2024-12-10 PROCEDURE — 94618 PULMONARY STRESS TESTING: CPT

## 2024-12-10 NOTE — PROCEDURES
[facility] Cardiopulmonary Rehabilitation   Six Minute Walk Test    Kirby Barahonadarryl CLARKB: 1945  Account Number: [id number]  AGE: 79 y.o.     OP test [x]   Pulmonary Rehab PRE []  POST   []    Diagnosis: Pulmonary Fibrosis       Referring Physician: Dr. Taylor    Gender [x]  male [] female AGE: 79     Height: 6 ft 4 in 193 cm    Weight: 259 lbs 118 kg  Blood Pressure 140/70    Medications affecting heart rate:  none      Supplemental O2 during the test [x]  no [] yes  lpm     [] continuous []  intermittent    Device : [] NC []  HFNC    []  oximizer  [] mask      Laps completed: 8 (1 lap = 100 ft)        Baseline (resting) Walking End of Test (recovery)      Heart Rate 74   105  74    BP  140/70   138/78  110/70    Dyspnea 0   0  0 (Jose Alberto scale)    Fatigue 0.5   2  0 (Jose Alberto scale)    SpO2  98%   93%              97%         Stopped or paused before 6 mins? [x]  no [] yes How long?     Symptoms at end of exercise:[] angina  [] dizziness  []  hip, leg, calf, back pain       [x]  no complaints []  other    Number of laps: 8 (x 30.48 meters)= 244 meters + final partial lap: 7 meters     Total distance walked in 6 mins: 251 meters    Predicted distance: 553 meters  Percent predicted: 45%              [] normal       [x] reduced     Summary:  This is an outpatient 6 minute walk test, performed on room air.  The patient ambulated 251 meters which is abnormal- 45 -% predicted.  His, heart rate response was normal, the blood pressure response was  blunted , oxygen saturations were normal.  The patient desaturated to 93% while ambulating on room air   The degree of symptoms based on the Jose Alberto Dyspnea/Fatigue scale were not significantly changed with testing.  This test does not indicate the need for supplemental oxygen.          REYNA Taylor DO  Pulmonary Rehab Director

## 2024-12-17 ENCOUNTER — TELEPHONE (OUTPATIENT)
Dept: PULMONOLOGY | Age: 79
End: 2024-12-17

## 2024-12-17 NOTE — TELEPHONE ENCOUNTER
Luis A does not qualify for oxygen. Please send the order to d/c the oxygen to [facility] and have them  his oxygen. Thank you!

## 2024-12-23 ENCOUNTER — HOSPITAL ENCOUNTER (OUTPATIENT)
Dept: PULMONOLOGY | Age: 79
Discharge: HOME | End: 2024-12-23
Attending: INTERNAL MEDICINE
Payer: MEDICARE

## 2024-12-23 DIAGNOSIS — J84.10 PULMONARY FIBROSIS (HCC): ICD-10-CM

## 2024-12-23 LAB
DLCO %PRED: 71 %
DLCO PRED: NORMAL
DLCO/VA %PRED: 67 %
DLCO/VA PRED: NORMAL
DLCO/VA: 2.59 ML/MIN/MMHG
DLCO: 18.96 ML/MIN/MMHG
EXPIRATORY TIME-POST: NORMAL
EXPIRATORY TIME: NORMAL
FEF 25-75 %CHNG: NORMAL
FEF 25-75 POST %PRED: NORMAL
FEF 25-75% %PRED-PRE: NORMAL
FEF 25-75% PRED: NORMAL
FEF 25-75-POST: NORMAL
FEF 25-75-PRE: NORMAL
FEV1 %PRED-POST: 123 %
FEV1 %PRED-PRE: 118 %
FEV1 PRED: NORMAL
FEV1-POST: NORMAL
FEV1-PRE: NORMAL
FEV1/FVC %PRED-POST: NORMAL
FEV1/FVC %PRED-PRE: NORMAL
FEV1/FVC PRED: NORMAL
FEV1/FVC-POST: 81 %
FEV1/FVC-PRE: 78 %
FVC %PRED-POST: 111 L
FVC %PRED-PRE: 112 %
FVC PRED: NORMAL
FVC-POST: 4.8 L
FVC-PRE: 4.83 L
GAW %PRED: NORMAL
GAW PRED: NORMAL
GAW: NORMAL
IC PRE %PRED: NORMAL
IC PRED: NORMAL
IC: NORMAL
MEP: NORMAL
MIP: NORMAL
MVV %PRED-PRE: NORMAL
MVV PRED: NORMAL
MVV-PRE: NORMAL
PEF %PRED-POST: NORMAL
PEF %PRED-PRE: NORMAL
PEF PRED: NORMAL
PEF%CHNG: NORMAL
PEF-POST: NORMAL
PEF-PRE: NORMAL
RAW %PRED: 48 %
RAW PRED: NORMAL
RAW: NORMAL
RV PRE %PRED: 104 %
RV PRED: NORMAL
RV: NORMAL
SVC %PRED: NORMAL
SVC PRED: NORMAL
SVC: NORMAL
TLC PRE %PRED: 100 %
TLC PRED: NORMAL
TLC: NORMAL
VA %PRED: 106 %
VA PRED: NORMAL
VA: 7.32 L
VTG %PRED: 97 %
VTG PRED: NORMAL
VTG: NORMAL

## 2024-12-23 PROCEDURE — 6370000000 HC RX 637 (ALT 250 FOR IP): Performed by: INTERNAL MEDICINE

## 2024-12-23 PROCEDURE — 94729 DIFFUSING CAPACITY: CPT

## 2024-12-23 PROCEDURE — 94726 PLETHYSMOGRAPHY LUNG VOLUMES: CPT

## 2024-12-23 PROCEDURE — 94640 AIRWAY INHALATION TREATMENT: CPT

## 2024-12-23 PROCEDURE — 94664 DEMO&/EVAL PT USE INHALER: CPT

## 2024-12-23 PROCEDURE — 94060 EVALUATION OF WHEEZING: CPT

## 2024-12-23 RX ORDER — ALBUTEROL SULFATE 90 UG/1
4 INHALANT RESPIRATORY (INHALATION) ONCE
Status: COMPLETED | OUTPATIENT
Start: 2024-12-23 | End: 2024-12-23

## 2024-12-23 RX ADMIN — ALBUTEROL SULFATE 4 PUFF: 90 AEROSOL, METERED RESPIRATORY (INHALATION) at 10:22

## 2024-12-23 ASSESSMENT — PULMONARY FUNCTION TESTS
FVC_PRE: 4.83
FEV1_PERCENT_PREDICTED_PRE: 118
FVC_PERCENT_PREDICTED_POST: 111
FEV1_PERCENT_PREDICTED_POST: 123
FVC_PERCENT_PREDICTED_PRE: 112
FEV1/FVC_PRE: 78
FVC_POST: 4.80
FEV1/FVC_POST: 81

## 2025-01-09 ENCOUNTER — OFFICE VISIT (OUTPATIENT)
Dept: ORTHOPEDIC SURGERY | Age: 80
End: 2025-01-09

## 2025-01-09 VITALS — HEIGHT: 76 IN | RESPIRATION RATE: 12 BRPM | BODY MASS INDEX: 33.24 KG/M2 | WEIGHT: 273 LBS

## 2025-01-09 DIAGNOSIS — M17.11 ARTHRITIS OF RIGHT KNEE: ICD-10-CM

## 2025-01-09 DIAGNOSIS — G89.29 CHRONIC PAIN OF RIGHT KNEE: Primary | ICD-10-CM

## 2025-01-09 DIAGNOSIS — M25.562 CHRONIC PAIN OF LEFT KNEE: ICD-10-CM

## 2025-01-09 DIAGNOSIS — G89.29 CHRONIC PAIN OF LEFT KNEE: ICD-10-CM

## 2025-01-09 DIAGNOSIS — M25.561 CHRONIC PAIN OF RIGHT KNEE: Primary | ICD-10-CM

## 2025-01-09 DIAGNOSIS — M17.12 ARTHRITIS OF LEFT KNEE: ICD-10-CM

## 2025-01-09 NOTE — PROGRESS NOTES
Kirby Dennison returns today with bilateral knee pain.    We did Zilretta injections back on August 15, 2024.  He says those were helpful until [place] time.  Pain today is worse on the left than the right.  Left is 7 out of 10.  Right is 5 out of 10.      Because of his diabetes we have been using Zilretta.  He is accompanied today by his son.           General Exam:     Vitals: Height 6' 4\" (1.93 m), weight 273 lb (123.8 kg).  Constitutional: Patient is adequately groomed with no evidence of malnutrition  Mental Status: The patient is oriented to time, place and person.  The patient's mood and affect are appropriate.        Bilateral hips are a bit stiff.       Right knee range of motion is 0 to about 110 degrees.  Left is 5 to about 110.  Valgus deformity on the right and varus on the left.  He has severe crepitation and moderate joint line pain lateral and right medial on the left.     He is grossly stable.     Bilateral knee x-rays were again reviewed.  These demonstrate: Windswept arthritic change with lateral compartment disease of the right knee that is bone-on-bone.  Medial compartment disease of the left that is bone-on-bone.    Bilateral patellofemoral bone-on-bone change.        Most recent hemoglobin A1c dated 12/2/2024 is 7.4    Assessment: End-stage arthritic change bilateral knees          Plan: Bilateral Zilretta injections.     Procedure: Under sterile technique, right knee was injected into the anterior medial arthroscopy portal with 32 mg of Zilretta.     Under sterile technique the left knee was injected into the anterolateral arthroscopy portal with 32 mg of Zilretta.     He tolerated both shots well.  If those are effective we can safely repeat them in about 4 months.     He is aware that the only permanent solution for his problem would be arthroplasty.

## 2025-01-28 ENCOUNTER — OFFICE VISIT (OUTPATIENT)
Dept: PULMONOLOGY | Age: 80
End: 2025-01-28
Payer: MEDICARE

## 2025-01-28 VITALS
OXYGEN SATURATION: 97 % | RESPIRATION RATE: 18 BRPM | BODY MASS INDEX: 35.29 KG/M2 | TEMPERATURE: 97.5 F | HEIGHT: 74 IN | SYSTOLIC BLOOD PRESSURE: 164 MMHG | DIASTOLIC BLOOD PRESSURE: 87 MMHG | WEIGHT: 275 LBS | HEART RATE: 74 BPM

## 2025-01-28 DIAGNOSIS — J84.10 PULMONARY FIBROSIS (HCC): Primary | ICD-10-CM

## 2025-01-28 DIAGNOSIS — Z87.891 HISTORY OF TOBACCO ABUSE: ICD-10-CM

## 2025-01-28 PROCEDURE — 1159F MED LIST DOCD IN RCRD: CPT | Performed by: INTERNAL MEDICINE

## 2025-01-28 PROCEDURE — 1123F ACP DISCUSS/DSCN MKR DOCD: CPT | Performed by: INTERNAL MEDICINE

## 2025-01-28 PROCEDURE — G8427 DOCREV CUR MEDS BY ELIG CLIN: HCPCS | Performed by: INTERNAL MEDICINE

## 2025-01-28 PROCEDURE — 99214 OFFICE O/P EST MOD 30 MIN: CPT | Performed by: INTERNAL MEDICINE

## 2025-01-28 PROCEDURE — 1036F TOBACCO NON-USER: CPT | Performed by: INTERNAL MEDICINE

## 2025-01-28 PROCEDURE — G8417 CALC BMI ABV UP PARAM F/U: HCPCS | Performed by: INTERNAL MEDICINE

## 2025-01-28 NOTE — PROGRESS NOTES
visit.         PHYSICAL EXAM:  Vitals:    01/28/25 1148   BP: (!) 164/87   Pulse: 74   Resp: 18   Temp: 97.5 °F (36.4 °C)   SpO2: 97%       GENERAL:  Well nourished, alert, appears stated age, no distress  HEENT:  No scleral icterus, no conjunctival irritation  NECK:  No thyromegaly, no bruits  LYMPH:  No cervical or supraclavicular adenopathy  HEART:  Irregular, distant heart sounds, no murmurs  LUNGS:  Fine crackles in bases but clear otherwise   ABDOMEN:  No distention, no organomegaly  EXTREMITIES:  No edema, no digital clubbing  NEURO:  No localizing deficits, CN II-XII intact.  Globally weak     Pulmonary Function Testing 12/24  Mild reduction in diffusing capacity     Chest imaging from 11/1 is reviewed.  My interpretation is 1.  No pulmonary embolism.   2.  Increased basilar predominant subpleural reticular abnormality and mild traction bronchiectasis in a probable UIP pattern.   2.  Multiple tiny bilateral basilar predominant predominantly pleural-based calcified nodules, probably sequela of remote granulomatous infection.      ECHO  11/24  Overall left ventricular ejection fraction is estimated to be 55-60%.   Mitral valve leaflets are mildly calcified in appearance.   Mild concentric left ventricular hypertrophy.   Left ventricular wall motion is normal.   Right ventricular systolic pressure is normal at <35 mmHg.   Mild mitral regurgitation.         I reviewed above labs and studies pertinent to this visit and date    Assessment/Plan:  1. Pulmonary fibrosis (HCC)  May be IPF vs work related from [employer].  He is getting CT next month>  I need to get disc for records.  PFT in 6 months.  Stay active. Unable to do rehab due to transportation     2. History of tobacco abuse  Does not qualify for screening due to >15 years since he quit     Follow up in 4 months

## 2025-02-17 ENCOUNTER — HOSPITAL ENCOUNTER (EMERGENCY)
Age: 80
Discharge: HOME | End: 2025-02-17
Payer: MEDICARE

## 2025-02-17 ENCOUNTER — APPOINTMENT (OUTPATIENT)
Dept: CT IMAGING | Age: 80
End: 2025-02-17
Payer: MEDICARE

## 2025-02-17 VITALS
TEMPERATURE: 97.8 F | HEART RATE: 61 BPM | DIASTOLIC BLOOD PRESSURE: 81 MMHG | OXYGEN SATURATION: 97 % | RESPIRATION RATE: 17 BRPM | SYSTOLIC BLOOD PRESSURE: 163 MMHG

## 2025-02-17 DIAGNOSIS — R10.9 RIGHT FLANK PAIN: Primary | ICD-10-CM

## 2025-02-17 DIAGNOSIS — N28.1 KIDNEY CYSTS: ICD-10-CM

## 2025-02-17 LAB
ALBUMIN SERPL-MCNC: 3.9 G/DL (ref 3.4–5)
ALBUMIN/GLOB SERPL: 1.6 {RATIO} (ref 1.1–2.2)
ALP SERPL-CCNC: 54 U/L (ref 40–129)
ALT SERPL-CCNC: 19 U/L (ref 10–40)
ANION GAP SERPL CALCULATED.3IONS-SCNC: 13 MMOL/L (ref 3–16)
AST SERPL-CCNC: 24 U/L (ref 15–37)
BACTERIA URNS QL MICRO: NORMAL /HPF
BASOPHILS # BLD: 0.1 K/UL (ref 0–0.2)
BASOPHILS NFR BLD: 0.7 %
BILIRUB SERPL-MCNC: 0.6 MG/DL (ref 0–1)
BILIRUB UR QL STRIP.AUTO: NEGATIVE
BUN SERPL-MCNC: 22 MG/DL (ref 7–20)
CALCIUM SERPL-MCNC: 10 MG/DL (ref 8.3–10.6)
CHLORIDE SERPL-SCNC: 104 MMOL/L (ref 99–110)
CLARITY UR: CLEAR
CO2 SERPL-SCNC: 22 MMOL/L (ref 21–32)
COLOR UR: YELLOW
CREAT SERPL-MCNC: 0.8 MG/DL (ref 0.8–1.3)
DEPRECATED RDW RBC AUTO: 15.3 % (ref 12.4–15.4)
EOSINOPHIL # BLD: 0.1 K/UL (ref 0–0.6)
EOSINOPHIL NFR BLD: 0.6 %
EPI CELLS #/AREA URNS AUTO: 0 /HPF (ref 0–5)
GFR SERPLBLD CREATININE-BSD FMLA CKD-EPI: >90 ML/MIN/{1.73_M2}
GLUCOSE SERPL-MCNC: 195 MG/DL (ref 70–99)
GLUCOSE UR STRIP.AUTO-MCNC: NEGATIVE MG/DL
HCT VFR BLD AUTO: 47.2 % (ref 40.5–52.5)
HGB BLD-MCNC: 16.2 G/DL (ref 13.5–17.5)
HGB UR QL STRIP.AUTO: NEGATIVE
HYALINE CASTS #/AREA URNS AUTO: 0 /LPF (ref 0–8)
KETONES UR STRIP.AUTO-MCNC: NEGATIVE MG/DL
LEUKOCYTE ESTERASE UR QL STRIP.AUTO: NEGATIVE
LIPASE SERPL-CCNC: 29 U/L (ref 13–60)
LYMPHOCYTES # BLD: 1.2 K/UL (ref 1–5.1)
LYMPHOCYTES NFR BLD: 12.3 %
MCH RBC QN AUTO: 28.8 PG (ref 26–34)
MCHC RBC AUTO-ENTMCNC: 34.3 G/DL (ref 31–36)
MCV RBC AUTO: 83.8 FL (ref 80–100)
MONOCYTES # BLD: 0.6 K/UL (ref 0–1.3)
MONOCYTES NFR BLD: 6.7 %
NEUTROPHILS # BLD: 7.5 K/UL (ref 1.7–7.7)
NEUTROPHILS NFR BLD: 79.7 %
NITRITE UR QL STRIP.AUTO: NEGATIVE
PH UR STRIP.AUTO: 6 [PH] (ref 5–8)
PLATELET # BLD AUTO: 218 K/UL (ref 135–450)
PMV BLD AUTO: 8.4 FL (ref 5–10.5)
POTASSIUM SERPL-SCNC: 4.1 MMOL/L (ref 3.5–5.1)
PROT SERPL-MCNC: 6.4 G/DL (ref 6.4–8.2)
PROT UR STRIP.AUTO-MCNC: 100 MG/DL
RBC # BLD AUTO: 5.63 M/UL (ref 4.2–5.9)
RBC CLUMPS #/AREA URNS AUTO: 1 /HPF (ref 0–4)
SODIUM SERPL-SCNC: 139 MMOL/L (ref 136–145)
SP GR UR STRIP.AUTO: 1.03 (ref 1–1.03)
UA COMPLETE W REFLEX CULTURE PNL UR: ABNORMAL
UA DIPSTICK W REFLEX MICRO PNL UR: YES
URN SPEC COLLECT METH UR: ABNORMAL
UROBILINOGEN UR STRIP-ACNC: 1 E.U./DL
WBC # BLD AUTO: 9.4 K/UL (ref 4–11)
WBC #/AREA URNS AUTO: 0 /HPF (ref 0–5)

## 2025-02-17 PROCEDURE — 6360000004 HC RX CONTRAST MEDICATION: Performed by: PHYSICIAN ASSISTANT

## 2025-02-17 PROCEDURE — 80053 COMPREHEN METABOLIC PANEL: CPT

## 2025-02-17 PROCEDURE — 81001 URINALYSIS AUTO W/SCOPE: CPT

## 2025-02-17 PROCEDURE — 99285 EMERGENCY DEPT VISIT HI MDM: CPT

## 2025-02-17 PROCEDURE — 83690 ASSAY OF LIPASE: CPT

## 2025-02-17 PROCEDURE — 74177 CT ABD & PELVIS W/CONTRAST: CPT

## 2025-02-17 PROCEDURE — 36415 COLL VENOUS BLD VENIPUNCTURE: CPT

## 2025-02-17 PROCEDURE — 93005 ELECTROCARDIOGRAM TRACING: CPT | Performed by: STUDENT IN AN ORGANIZED HEALTH CARE EDUCATION/TRAINING PROGRAM

## 2025-02-17 PROCEDURE — 85025 COMPLETE CBC W/AUTO DIFF WBC: CPT

## 2025-02-17 RX ORDER — LIDOCAINE 50 MG/G
1 PATCH TOPICAL DAILY
Qty: 20 PATCH | Refills: 0 | Status: SHIPPED | OUTPATIENT
Start: 2025-02-17 | End: 2025-03-09

## 2025-02-17 RX ORDER — METHOCARBAMOL 750 MG/1
750 TABLET, FILM COATED ORAL 2 TIMES DAILY
Qty: 20 TABLET | Refills: 0 | Status: SHIPPED | OUTPATIENT
Start: 2025-02-17

## 2025-02-17 RX ORDER — IOPAMIDOL 755 MG/ML
75 INJECTION, SOLUTION INTRAVASCULAR
Status: COMPLETED | OUTPATIENT
Start: 2025-02-17 | End: 2025-02-17

## 2025-02-17 RX ADMIN — IOPAMIDOL 75 ML: 755 INJECTION, SOLUTION INTRAVENOUS at 20:19

## 2025-02-17 ASSESSMENT — ENCOUNTER SYMPTOMS
SORE THROAT: 0
EYE PAIN: 0
BACK PAIN: 0
COUGH: 0
ABDOMINAL PAIN: 0
NAUSEA: 0
SHORTNESS OF BREATH: 0
VOMITING: 0

## 2025-02-17 ASSESSMENT — PAIN DESCRIPTION - ORIENTATION: ORIENTATION: RIGHT

## 2025-02-17 ASSESSMENT — PAIN DESCRIPTION - DESCRIPTORS: DESCRIPTORS: ACHING

## 2025-02-17 ASSESSMENT — PAIN DESCRIPTION - LOCATION: LOCATION: FLANK

## 2025-02-17 ASSESSMENT — PAIN SCALES - GENERAL: PAINLEVEL_OUTOF10: 8

## 2025-02-17 ASSESSMENT — PAIN - FUNCTIONAL ASSESSMENT: PAIN_FUNCTIONAL_ASSESSMENT: 0-10

## 2025-02-17 NOTE — ED PROVIDER NOTES
EKG My Reading:  Rate: 63  Rhythm: paced  ST Segments: lateral T inversions present  STEMI: no  QTc: 534 (prolonged)  LBBB present, Sgarbossa criteria not met  Comparison to prior study from 02/2023  Rate has increased  Rhythm is now paced  QTc prolongation is now present  Biphasic T wave no longer present in precordial leads  T inversions now present in leads I and AVL     Francisco Javier Almeida MD  02/17/25 9846

## 2025-02-18 NOTE — ED PROVIDER NOTES
**ADVANCED PRACTICE PROVIDER, I HAVE EVALUATED THIS PATIENT**        [facility] EMERGENCY DEPARTMENT  EMERGENCY DEPARTMENT ENCOUNTER      Pt Name: Kirby Dennison  MRN:[id number]  Birthdate 1945  Date of evaluation: 2/17/2025  Provider: Thomas Smith PA-C  Note Started: 10:06 PM EST 2/17/25        Chief Complaint:    Chief Complaint   Patient presents with    Flank Pain     Pt states with right sided  flank pain.  Pt states  he has tried OTC lidocaine patches without relief.   Pt sees PT at home.   Pt states he may have taken his BP  meds twice today. Pt states  his /90.  Pt states he took nap this afternoon and stood up with increased back pain.   Pt has had dysuria for about 2 months.          Nursing Notes, Past Medical Hx, Past Surgical Hx, Social Hx, Allergies, and Family Hx were all reviewed and agreed with or any disagreements were addressed in the HPI.    HPI: (Location, Duration, Timing, Severity, Quality, Assoc Sx, Context, Modifying factors)    History From: Patient  Limitations to history : None    Social Determinants Significantly Affecting Health : None    Chief Complaint of right flank pain.  Patient states for the last couple days if flank pain got worse today.  When he woke up today had it.  He did use a lidocaine patch from over-the-counter and said it did not help as much.  He kind of laid down this afternoon and when he woke up he was worse.  So he came in.  He denies blood in his urine.  No history of kidney stones.  No pain with urination no dark urine.  No chest pain, no injuries.  No weaknesses.  No nausea vomiting.  No other complaints.  Pain more with movement.    This is a  79 y.o. male who presents to the emergency room with the above complaint    PastMedical/Surgical History:      Diagnosis Date    AAA (abdominal aortic aneurysm) ([facility]) 2013    Ascending aortic aneurysm-4.2 cm by CT 6/2013    Atrial fibrillation ([facility]) 2017    Balance problem     uses a

## 2025-02-18 NOTE — DISCHARGE INSTRUCTIONS
Take prescribed medication as prescribed only  Follow-up with urologist Dr. Kelly  Return emergency room as needed otherwise follow-up primary care provider as needed

## 2025-02-19 LAB
EKG ATRIAL RATE: 416 BPM
EKG DIAGNOSIS: NORMAL
EKG Q-T INTERVAL: 522 MS
EKG QRS DURATION: 194 MS
EKG QTC CALCULATION (BAZETT): 534 MS
EKG R AXIS: -70 DEGREES
EKG T AXIS: 102 DEGREES
EKG VENTRICULAR RATE: 63 BPM

## 2025-02-19 PROCEDURE — 93010 ELECTROCARDIOGRAM REPORT: CPT | Performed by: INTERNAL MEDICINE

## 2025-05-09 ENCOUNTER — TRANSCRIBE ORDERS (OUTPATIENT)
Dept: ADMINISTRATIVE | Age: 80
End: 2025-05-09

## 2025-05-09 DIAGNOSIS — N28.1 RENAL CYST: Primary | ICD-10-CM

## 2025-05-19 ENCOUNTER — OFFICE VISIT (OUTPATIENT)
Dept: ORTHOPEDIC SURGERY | Age: 80
End: 2025-05-19
Payer: MEDICARE

## 2025-05-19 VITALS — WEIGHT: 275 LBS | HEIGHT: 74 IN | BODY MASS INDEX: 35.29 KG/M2

## 2025-05-19 DIAGNOSIS — M17.12 ARTHRITIS OF LEFT KNEE: Primary | ICD-10-CM

## 2025-05-19 DIAGNOSIS — M17.11 ARTHRITIS OF RIGHT KNEE: ICD-10-CM

## 2025-05-19 PROCEDURE — 20610 DRAIN/INJ JOINT/BURSA W/O US: CPT | Performed by: ORTHOPAEDIC SURGERY

## 2025-05-19 RX ORDER — SEMAGLUTIDE 0.68 MG/ML
0.25 INJECTION, SOLUTION SUBCUTANEOUS
COMMUNITY

## 2025-05-19 RX ORDER — LISINOPRIL 40 MG/1
TABLET ORAL
COMMUNITY

## 2025-05-19 NOTE — PROGRESS NOTES
Kirby Dennison returns today with bilateral knee pain.    Her last set of injections were given January 9, 2025.  He said those were helpful until 3 weeks ago.  Pain is now 6 out of 10 in the left.        Because of his diabetes we have been using Zilretta.  He is using his walker.          General Exam:     Vitals: Height 6' 4\" (1.93 m), weight 273 lb (123.8 kg).  Constitutional: Patient is adequately groomed with no evidence of malnutrition  Mental Status: The patient is oriented to time, place and person.  The patient's mood and affect are appropriate.        Bilateral hips are a bit stiff.       Right knee range of motion is 0 to about 110 degrees.  Left is 5 to about 110.  Valgus deformity on the right and varus on the left.  He has severe crepitation and moderate joint line pain lateral and right medial on the left.     He is grossly stable.     Bilateral knee x-rays were again reviewed.  These demonstrate: Windswept arthritic change with lateral compartment disease of the right knee that is bone-on-bone.  Medial compartment disease of the left that is bone-on-bone.     Bilateral patellofemoral bone-on-bone change.        Most recent hemoglobin A1c dated 12/2/2024 is 7.4     Assessment: End-stage arthritic change bilateral knees          Plan: Bilateral Zilretta injections.     Procedure: Under sterile technique, right knee was injected into the anterior medial arthroscopy portal with 32 mg of Zilretta.     Under sterile technique the left knee was injected into the anterolateral arthroscopy portal with 32 mg of Zilretta.     He tolerated both shots well.  If those are effective we can safely repeat them in about 4 months.     He is aware that the only permanent solution for his problem would be arthroplasty.

## 2025-05-27 ENCOUNTER — TELEPHONE (OUTPATIENT)
Dept: PULMONOLOGY | Age: 80
End: 2025-05-27

## 2025-05-27 NOTE — TELEPHONE ENCOUNTER
Patient daughter is calling asking about scans Ctscan   Daughter stated they have dropped to office in the past  The daughter wants to make sure we actually have these CD so the doctor can look at. If we do not have please call daughter today or tomorrow.. They will have to cancel Appt for Thursday. It is a lot for patient to come in.     Daughter [phone] Mariana Corbin

## 2025-05-29 ENCOUNTER — OFFICE VISIT (OUTPATIENT)
Dept: PULMONOLOGY | Age: 80
End: 2025-05-29
Payer: MEDICARE

## 2025-05-29 VITALS
BODY MASS INDEX: 32.88 KG/M2 | OXYGEN SATURATION: 99 % | TEMPERATURE: 97.6 F | SYSTOLIC BLOOD PRESSURE: 157 MMHG | RESPIRATION RATE: 18 BRPM | WEIGHT: 270 LBS | HEIGHT: 76 IN | HEART RATE: 79 BPM | DIASTOLIC BLOOD PRESSURE: 89 MMHG

## 2025-05-29 DIAGNOSIS — B37.0 THRUSH: ICD-10-CM

## 2025-05-29 DIAGNOSIS — J84.112 UIP (USUAL INTERSTITIAL PNEUMONITIS) (HCC): Primary | ICD-10-CM

## 2025-05-29 DIAGNOSIS — Z87.891 HISTORY OF TOBACCO ABUSE: ICD-10-CM

## 2025-05-29 PROCEDURE — G8417 CALC BMI ABV UP PARAM F/U: HCPCS | Performed by: INTERNAL MEDICINE

## 2025-05-29 PROCEDURE — 1159F MED LIST DOCD IN RCRD: CPT | Performed by: INTERNAL MEDICINE

## 2025-05-29 PROCEDURE — 1123F ACP DISCUSS/DSCN MKR DOCD: CPT | Performed by: INTERNAL MEDICINE

## 2025-05-29 PROCEDURE — G8427 DOCREV CUR MEDS BY ELIG CLIN: HCPCS | Performed by: INTERNAL MEDICINE

## 2025-05-29 PROCEDURE — 99214 OFFICE O/P EST MOD 30 MIN: CPT | Performed by: INTERNAL MEDICINE

## 2025-05-29 PROCEDURE — 1036F TOBACCO NON-USER: CPT | Performed by: INTERNAL MEDICINE

## 2025-05-29 PROCEDURE — G2211 COMPLEX E/M VISIT ADD ON: HCPCS | Performed by: INTERNAL MEDICINE

## 2025-05-29 RX ORDER — CLOTRIMAZOLE 10 MG/1
10 LOZENGE ORAL
Qty: 50 TABLET | Refills: 0 | Status: SHIPPED | OUTPATIENT
Start: 2025-05-29 | End: 2025-06-08

## 2025-05-29 NOTE — PROGRESS NOTES
daily as needed      SITagliptin (JANUVIA) 100 MG tablet Take 1 tablet by mouth daily Takes if Blood sugar over 140 in am      rosuvastatin (CRESTOR) 40 MG tablet Take 1 tablet by mouth every evening      ONE TOUCH ULTRASOFT LANCETS MISC USE DAILY AS DIRECTED      ergocalciferol (ERGOCALCIFEROL) 1.25 MG (34914 UT) capsule TAKE 1 CAPSULE BY MOUTH EVERY 7 DAYS       No current facility-administered medications for this visit.         PHYSICAL EXAM:  Vitals:    05/29/25 1044   BP: (!) 157/89   Pulse: 79   Resp: 18   Temp: 97.6 °F (36.4 °C)   SpO2: 99%         GENERAL:  Well nourished, alert, appears stated age, no distress  HEENT:  No scleral icterus, no conjunctival irritation.  White plaques on posterior pharynx   NECK:  No thyromegaly, no bruits  LYMPH:  No cervical or supraclavicular adenopathy  HEART:  Irregular, distant heart sounds, no murmurs  LUNGS:  Essentially clear.  Faint crackles and diminished   ABDOMEN:  No distention, no organomegaly  EXTREMITIES:  No edema, no digital clubbing  NEURO:  No localizing deficits, CN II-XII intact.  Globally weak     Pulmonary Function Testing 12/24  Mild reduction in diffusing capacity     Chest imaging from 11/1 is reviewed.  My interpretation is 1.  No pulmonary embolism.   2.  Increased basilar predominant subpleural reticular abnormality and mild traction bronchiectasis in a probable UIP pattern.   2.  Multiple tiny bilateral basilar predominant predominantly pleural-based calcified nodules, probably sequela of remote granulomatous infection.      CT 2/25            ECHO  11/24  Overall left ventricular ejection fraction is estimated to be 55-60%.   Mitral valve leaflets are mildly calcified in appearance.   Mild concentric left ventricular hypertrophy.   Left ventricular wall motion is normal.   Right ventricular systolic pressure is normal at <35 mmHg.   Mild mitral regurgitation.         I reviewed above labs and studies pertinent to this visit and

## 2025-07-15 ENCOUNTER — HOSPITAL ENCOUNTER (OUTPATIENT)
Dept: MRI IMAGING | Age: 80
Discharge: HOME | End: 2025-07-15
Payer: MEDICARE

## 2025-07-15 ENCOUNTER — HOSPITAL ENCOUNTER (OUTPATIENT)
Dept: GENERAL RADIOLOGY | Age: 80
Discharge: HOME | End: 2025-07-15
Payer: MEDICARE

## 2025-07-15 DIAGNOSIS — N28.1 RENAL CYST: ICD-10-CM

## 2025-07-15 LAB
PERFORMED ON: NORMAL
POC CREATININE: 0.8 MG/DL (ref 0.8–1.3)
POC SAMPLE TYPE: NORMAL

## 2025-07-15 PROCEDURE — A9579 GAD-BASE MR CONTRAST NOS,1ML: HCPCS

## 2025-07-15 PROCEDURE — 74183 MRI ABD W/O CNTR FLWD CNTR: CPT

## 2025-07-15 PROCEDURE — 71046 X-RAY EXAM CHEST 2 VIEWS: CPT

## 2025-07-15 PROCEDURE — 6360000004 HC RX CONTRAST MEDICATION

## 2025-07-15 PROCEDURE — 82565 ASSAY OF CREATININE: CPT

## 2025-07-15 RX ORDER — GADOTERIDOL 279.3 MG/ML
20 INJECTION INTRAVENOUS
Status: COMPLETED | OUTPATIENT
Start: 2025-07-15 | End: 2025-07-15

## 2025-07-15 RX ADMIN — GADOTERIDOL 20 ML: 279.3 INJECTION, SOLUTION INTRAVENOUS at 15:24

## 2025-08-25 ENCOUNTER — TELEPHONE (OUTPATIENT)
Dept: ORTHOPEDIC SURGERY | Age: 80
End: 2025-08-25

## (undated) DEVICE — ENDOSCOPY KIT: Brand: MEDLINE INDUSTRIES, INC.

## (undated) DEVICE — BITE BLOCK ENDOSCP AD 60 FR W/ ADJ STRP PLAS GRN BLOX

## (undated) DEVICE — FORMALIN CLEAR VIAL 20 ML 10%

## (undated) DEVICE — FORCEPS BX 240CM 2.4MM L NDL RAD JAW 4 M00513334